# Patient Record
Sex: MALE | Race: WHITE | NOT HISPANIC OR LATINO
[De-identification: names, ages, dates, MRNs, and addresses within clinical notes are randomized per-mention and may not be internally consistent; named-entity substitution may affect disease eponyms.]

---

## 2017-01-10 ENCOUNTER — APPOINTMENT (OUTPATIENT)
Dept: CARDIOLOGY | Facility: CLINIC | Age: 72
End: 2017-01-10

## 2017-01-13 ENCOUNTER — APPOINTMENT (OUTPATIENT)
Dept: CARDIOLOGY | Facility: CLINIC | Age: 72
End: 2017-01-13

## 2017-01-13 ENCOUNTER — NON-APPOINTMENT (OUTPATIENT)
Age: 72
End: 2017-01-13

## 2017-01-13 VITALS
HEART RATE: 74 BPM | RESPIRATION RATE: 17 BRPM | BODY MASS INDEX: 25.47 KG/M2 | DIASTOLIC BLOOD PRESSURE: 85 MMHG | SYSTOLIC BLOOD PRESSURE: 159 MMHG | OXYGEN SATURATION: 97 % | WEIGHT: 188 LBS | HEIGHT: 72 IN

## 2017-01-18 LAB
ALBUMIN SERPL ELPH-MCNC: 3.8 G/DL
ALP BLD-CCNC: 49 U/L
ALT SERPL-CCNC: 15 U/L
ANION GAP SERPL CALC-SCNC: 13 MMOL/L
AST SERPL-CCNC: 21 U/L
BILIRUB SERPL-MCNC: 0.9 MG/DL
BUN SERPL-MCNC: 19 MG/DL
CALCIUM SERPL-MCNC: 9 MG/DL
CHLORIDE SERPL-SCNC: 104 MMOL/L
CHOLEST SERPL-MCNC: 145 MG/DL
CHOLEST/HDLC SERPL: 3.7 RATIO
CO2 SERPL-SCNC: 23 MMOL/L
CREAT SERPL-MCNC: 1.12 MG/DL
GLUCOSE SERPL-MCNC: 92 MG/DL
HDLC SERPL-MCNC: 39 MG/DL
LDLC SERPL CALC-MCNC: 95 MG/DL
POTASSIUM SERPL-SCNC: 4.3 MMOL/L
PROT SERPL-MCNC: 6.7 G/DL
SODIUM SERPL-SCNC: 140 MMOL/L
TRIGL SERPL-MCNC: 53 MG/DL

## 2017-03-07 ENCOUNTER — APPOINTMENT (OUTPATIENT)
Dept: FAMILY MEDICINE | Facility: CLINIC | Age: 72
End: 2017-03-07

## 2017-03-07 VITALS
HEIGHT: 72 IN | BODY MASS INDEX: 25.47 KG/M2 | HEART RATE: 74 BPM | DIASTOLIC BLOOD PRESSURE: 80 MMHG | WEIGHT: 188 LBS | TEMPERATURE: 98.8 F | RESPIRATION RATE: 16 BRPM | OXYGEN SATURATION: 98 % | SYSTOLIC BLOOD PRESSURE: 130 MMHG

## 2017-03-07 DIAGNOSIS — K44.9 DIAPHRAGMATIC HERNIA W/OUT OBSTRUCTION OR GANGRENE: ICD-10-CM

## 2017-03-08 RX ORDER — ESOMEPRAZOLE MAGNESIUM 40 MG/1
40 CAPSULE, DELAYED RELEASE ORAL DAILY
Qty: 30 | Refills: 0 | Status: DISCONTINUED | COMMUNITY
Start: 2017-03-07 | End: 2017-03-08

## 2017-04-17 ENCOUNTER — RX RENEWAL (OUTPATIENT)
Age: 72
End: 2017-04-17

## 2017-05-05 ENCOUNTER — APPOINTMENT (OUTPATIENT)
Dept: CARDIOLOGY | Facility: CLINIC | Age: 72
End: 2017-05-05

## 2017-05-05 VITALS
SYSTOLIC BLOOD PRESSURE: 159 MMHG | OXYGEN SATURATION: 97 % | DIASTOLIC BLOOD PRESSURE: 77 MMHG | HEART RATE: 85 BPM | WEIGHT: 190 LBS | RESPIRATION RATE: 17 BRPM | BODY MASS INDEX: 25.73 KG/M2 | HEIGHT: 72 IN

## 2017-05-05 DIAGNOSIS — R93.8 ABNORMAL FINDINGS ON DIAGNOSTIC IMAGING OF OTHER SPECIFIED BODY STRUCTURES: ICD-10-CM

## 2017-05-05 RX ORDER — UBIDECARENONE/VIT E ACET 100MG-5
100 CAPSULE ORAL TWICE DAILY
Qty: 180 | Refills: 1 | Status: ACTIVE | COMMUNITY
Start: 2017-05-05 | End: 1900-01-01

## 2017-06-28 LAB
ALBUMIN SERPL ELPH-MCNC: 3.9 G/DL
ALP BLD-CCNC: 60 U/L
ALT SERPL-CCNC: 13 U/L
ANION GAP SERPL CALC-SCNC: 14 MMOL/L
AST SERPL-CCNC: 17 U/L
BILIRUB SERPL-MCNC: 1 MG/DL
BUN SERPL-MCNC: 18 MG/DL
CALCIUM SERPL-MCNC: 9.1 MG/DL
CHLORIDE SERPL-SCNC: 101 MMOL/L
CHOLEST SERPL-MCNC: 111 MG/DL
CHOLEST/HDLC SERPL: 2.8 RATIO
CO2 SERPL-SCNC: 24 MMOL/L
CREAT SERPL-MCNC: 1.11 MG/DL
GLUCOSE SERPL-MCNC: 102 MG/DL
HDLC SERPL-MCNC: 39 MG/DL
LDLC SERPL CALC-MCNC: 63 MG/DL
POTASSIUM SERPL-SCNC: 4.2 MMOL/L
PROT SERPL-MCNC: 7.5 G/DL
SODIUM SERPL-SCNC: 139 MMOL/L
TRIGL SERPL-MCNC: 47 MG/DL

## 2017-07-24 ENCOUNTER — RX RENEWAL (OUTPATIENT)
Age: 72
End: 2017-07-24

## 2017-07-31 ENCOUNTER — CLINICAL ADVICE (OUTPATIENT)
Age: 72
End: 2017-07-31

## 2017-09-01 ENCOUNTER — APPOINTMENT (OUTPATIENT)
Dept: FAMILY MEDICINE | Facility: CLINIC | Age: 72
End: 2017-09-01
Payer: MEDICARE

## 2017-09-01 VITALS
TEMPERATURE: 98 F | BODY MASS INDEX: 25.19 KG/M2 | WEIGHT: 186 LBS | OXYGEN SATURATION: 99 % | HEART RATE: 70 BPM | RESPIRATION RATE: 15 BRPM | HEIGHT: 72 IN | DIASTOLIC BLOOD PRESSURE: 70 MMHG | SYSTOLIC BLOOD PRESSURE: 130 MMHG

## 2017-09-01 DIAGNOSIS — Z92.29 PERSONAL HISTORY OF OTHER DRUG THERAPY: ICD-10-CM

## 2017-09-01 PROCEDURE — 90686 IIV4 VACC NO PRSV 0.5 ML IM: CPT

## 2017-09-01 PROCEDURE — G0008: CPT

## 2017-09-01 PROCEDURE — 99397 PER PM REEVAL EST PAT 65+ YR: CPT | Mod: 25

## 2017-09-08 ENCOUNTER — NON-APPOINTMENT (OUTPATIENT)
Age: 72
End: 2017-09-08

## 2017-09-08 ENCOUNTER — APPOINTMENT (OUTPATIENT)
Dept: CARDIOLOGY | Facility: CLINIC | Age: 72
End: 2017-09-08
Payer: MEDICARE

## 2017-09-08 VITALS
OXYGEN SATURATION: 98 % | HEART RATE: 74 BPM | SYSTOLIC BLOOD PRESSURE: 159 MMHG | RESPIRATION RATE: 16 BRPM | BODY MASS INDEX: 25.33 KG/M2 | HEIGHT: 72 IN | WEIGHT: 187 LBS | DIASTOLIC BLOOD PRESSURE: 78 MMHG

## 2017-09-08 PROCEDURE — 93000 ELECTROCARDIOGRAM COMPLETE: CPT

## 2017-09-08 PROCEDURE — 99214 OFFICE O/P EST MOD 30 MIN: CPT

## 2017-10-05 ENCOUNTER — EMERGENCY (EMERGENCY)
Facility: HOSPITAL | Age: 72
LOS: 1 days | Discharge: HOME | End: 2017-10-05
Admitting: FAMILY MEDICINE
Payer: MEDICARE

## 2017-10-05 PROCEDURE — 99284 EMERGENCY DEPT VISIT MOD MDM: CPT

## 2017-10-05 PROCEDURE — 99285 EMERGENCY DEPT VISIT HI MDM: CPT

## 2017-10-05 PROCEDURE — 85610 PROTHROMBIN TIME: CPT

## 2017-10-05 PROCEDURE — 71275 CT ANGIOGRAPHY CHEST: CPT

## 2017-10-05 PROCEDURE — 85730 THROMBOPLASTIN TIME PARTIAL: CPT

## 2017-10-05 PROCEDURE — 85027 COMPLETE CBC AUTOMATED: CPT

## 2017-10-05 PROCEDURE — 99284 EMERGENCY DEPT VISIT MOD MDM: CPT | Mod: 25

## 2017-10-05 PROCEDURE — 74174 CTA ABD&PLVS W/CONTRAST: CPT | Mod: 26

## 2017-10-05 PROCEDURE — 96360 HYDRATION IV INFUSION INIT: CPT | Mod: XU

## 2017-10-05 PROCEDURE — 71275 CT ANGIOGRAPHY CHEST: CPT | Mod: 26

## 2017-10-05 PROCEDURE — 82550 ASSAY OF CK (CPK): CPT

## 2017-10-05 PROCEDURE — 80053 COMPREHEN METABOLIC PANEL: CPT

## 2017-10-05 PROCEDURE — 82553 CREATINE MB FRACTION: CPT

## 2017-10-05 PROCEDURE — 74174 CTA ABD&PLVS W/CONTRAST: CPT

## 2017-10-05 PROCEDURE — 84484 ASSAY OF TROPONIN QUANT: CPT

## 2017-10-11 ENCOUNTER — RX RENEWAL (OUTPATIENT)
Age: 72
End: 2017-10-11

## 2017-10-18 ENCOUNTER — MEDICATION RENEWAL (OUTPATIENT)
Age: 72
End: 2017-10-18

## 2017-10-19 ENCOUNTER — APPOINTMENT (OUTPATIENT)
Dept: FAMILY MEDICINE | Facility: CLINIC | Age: 72
End: 2017-10-19
Payer: MEDICARE

## 2017-10-19 ENCOUNTER — MEDICATION RENEWAL (OUTPATIENT)
Age: 72
End: 2017-10-19

## 2017-10-19 VITALS
DIASTOLIC BLOOD PRESSURE: 80 MMHG | BODY MASS INDEX: 25.87 KG/M2 | WEIGHT: 191 LBS | TEMPERATURE: 97.6 F | SYSTOLIC BLOOD PRESSURE: 130 MMHG | RESPIRATION RATE: 16 BRPM | OXYGEN SATURATION: 97 % | HEIGHT: 72 IN | HEART RATE: 66 BPM

## 2017-10-19 PROCEDURE — 99213 OFFICE O/P EST LOW 20 MIN: CPT

## 2017-11-02 ENCOUNTER — FORM ENCOUNTER (OUTPATIENT)
Age: 72
End: 2017-11-02

## 2017-11-03 ENCOUNTER — APPOINTMENT (OUTPATIENT)
Dept: FAMILY MEDICINE | Facility: CLINIC | Age: 72
End: 2017-11-03
Payer: MEDICARE

## 2017-11-03 ENCOUNTER — OUTPATIENT (OUTPATIENT)
Dept: OUTPATIENT SERVICES | Facility: HOSPITAL | Age: 72
LOS: 1 days | End: 2017-11-03
Payer: MEDICARE

## 2017-11-03 VITALS
TEMPERATURE: 98.6 F | WEIGHT: 190 LBS | BODY MASS INDEX: 25.73 KG/M2 | RESPIRATION RATE: 14 BRPM | DIASTOLIC BLOOD PRESSURE: 70 MMHG | OXYGEN SATURATION: 98 % | HEART RATE: 84 BPM | HEIGHT: 72 IN | SYSTOLIC BLOOD PRESSURE: 120 MMHG

## 2017-11-03 PROCEDURE — 73562 X-RAY EXAM OF KNEE 3: CPT | Mod: 26,RT

## 2017-11-03 PROCEDURE — 73562 X-RAY EXAM OF KNEE 3: CPT

## 2017-11-03 PROCEDURE — 99213 OFFICE O/P EST LOW 20 MIN: CPT

## 2017-11-06 ENCOUNTER — OUTPATIENT (OUTPATIENT)
Dept: OUTPATIENT SERVICES | Facility: HOSPITAL | Age: 72
LOS: 1 days | End: 2017-11-06
Payer: MEDICARE

## 2017-11-29 ENCOUNTER — APPOINTMENT (OUTPATIENT)
Dept: INTERNAL MEDICINE | Facility: CLINIC | Age: 72
End: 2017-11-29
Payer: MEDICARE

## 2017-11-29 VITALS
RESPIRATION RATE: 15 BRPM | DIASTOLIC BLOOD PRESSURE: 76 MMHG | SYSTOLIC BLOOD PRESSURE: 124 MMHG | TEMPERATURE: 98.4 F | WEIGHT: 190 LBS | HEIGHT: 72 IN | BODY MASS INDEX: 25.73 KG/M2 | HEART RATE: 88 BPM

## 2017-11-29 DIAGNOSIS — Z12.11 ENCOUNTER FOR SCREENING FOR MALIGNANT NEOPLASM OF COLON: ICD-10-CM

## 2017-11-29 DIAGNOSIS — M25.561 PAIN IN RIGHT KNEE: ICD-10-CM

## 2017-11-29 PROCEDURE — 97110 THERAPEUTIC EXERCISES: CPT

## 2017-11-29 PROCEDURE — 99215 OFFICE O/P EST HI 40 MIN: CPT

## 2017-11-29 PROCEDURE — G0283: CPT

## 2017-11-29 PROCEDURE — 97112 NEUROMUSCULAR REEDUCATION: CPT

## 2017-11-29 PROCEDURE — 97161 PT EVAL LOW COMPLEX 20 MIN: CPT

## 2017-11-30 ENCOUNTER — APPOINTMENT (OUTPATIENT)
Dept: FAMILY MEDICINE | Facility: CLINIC | Age: 72
End: 2017-11-30
Payer: MEDICARE

## 2017-11-30 VITALS
RESPIRATION RATE: 14 BRPM | HEART RATE: 77 BPM | DIASTOLIC BLOOD PRESSURE: 80 MMHG | BODY MASS INDEX: 25.73 KG/M2 | TEMPERATURE: 97.8 F | WEIGHT: 190 LBS | HEIGHT: 72 IN | SYSTOLIC BLOOD PRESSURE: 120 MMHG | OXYGEN SATURATION: 97 %

## 2017-11-30 LAB
FOLATE SERPL-MCNC: 16 NG/ML
VIT B12 SERPL-MCNC: 385 PG/ML

## 2017-11-30 PROCEDURE — 99214 OFFICE O/P EST MOD 30 MIN: CPT

## 2018-01-05 ENCOUNTER — OUTPATIENT (OUTPATIENT)
Dept: OUTPATIENT SERVICES | Facility: HOSPITAL | Age: 73
LOS: 1 days | End: 2018-01-05
Payer: MEDICARE

## 2018-01-05 ENCOUNTER — APPOINTMENT (OUTPATIENT)
Dept: MRI IMAGING | Facility: HOSPITAL | Age: 73
End: 2018-01-05
Payer: MEDICARE

## 2018-01-05 DIAGNOSIS — Z00.8 ENCOUNTER FOR OTHER GENERAL EXAMINATION: ICD-10-CM

## 2018-01-05 PROCEDURE — 72148 MRI LUMBAR SPINE W/O DYE: CPT | Mod: 26

## 2018-01-05 PROCEDURE — 72148 MRI LUMBAR SPINE W/O DYE: CPT

## 2018-01-12 ENCOUNTER — APPOINTMENT (OUTPATIENT)
Dept: CARDIOLOGY | Facility: CLINIC | Age: 73
End: 2018-01-12
Payer: MEDICARE

## 2018-01-12 VITALS
BODY MASS INDEX: 25.47 KG/M2 | RESPIRATION RATE: 15 BRPM | HEIGHT: 72 IN | HEART RATE: 81 BPM | WEIGHT: 188 LBS | DIASTOLIC BLOOD PRESSURE: 89 MMHG | OXYGEN SATURATION: 97 % | SYSTOLIC BLOOD PRESSURE: 150 MMHG

## 2018-01-12 PROCEDURE — 99214 OFFICE O/P EST MOD 30 MIN: CPT

## 2018-01-12 PROCEDURE — 93306 TTE W/DOPPLER COMPLETE: CPT

## 2018-01-12 PROCEDURE — 93000 ELECTROCARDIOGRAM COMPLETE: CPT

## 2018-01-18 ENCOUNTER — APPOINTMENT (OUTPATIENT)
Dept: FAMILY MEDICINE | Facility: CLINIC | Age: 73
End: 2018-01-18
Payer: MEDICARE

## 2018-01-18 PROCEDURE — 90715 TDAP VACCINE 7 YRS/> IM: CPT

## 2018-01-18 PROCEDURE — 90471 IMMUNIZATION ADMIN: CPT

## 2018-02-05 ENCOUNTER — APPOINTMENT (OUTPATIENT)
Dept: INTERNAL MEDICINE | Facility: HOSPITAL | Age: 73
End: 2018-02-05

## 2018-03-05 ENCOUNTER — APPOINTMENT (OUTPATIENT)
Dept: INTERNAL MEDICINE | Facility: HOSPITAL | Age: 73
End: 2018-03-05
Payer: MEDICARE

## 2018-03-05 ENCOUNTER — RESULT REVIEW (OUTPATIENT)
Age: 73
End: 2018-03-05

## 2018-03-05 ENCOUNTER — OUTPATIENT (OUTPATIENT)
Dept: OUTPATIENT SERVICES | Facility: HOSPITAL | Age: 73
LOS: 1 days | End: 2018-03-05
Payer: MEDICARE

## 2018-03-05 DIAGNOSIS — Z12.11 ENCOUNTER FOR SCREENING FOR MALIGNANT NEOPLASM OF COLON: ICD-10-CM

## 2018-03-05 PROCEDURE — 45380 COLONOSCOPY AND BIOPSY: CPT | Mod: PT

## 2018-03-05 PROCEDURE — 88305 TISSUE EXAM BY PATHOLOGIST: CPT | Mod: 26

## 2018-03-05 PROCEDURE — 88305 TISSUE EXAM BY PATHOLOGIST: CPT

## 2018-03-05 PROCEDURE — 45380 COLONOSCOPY AND BIOPSY: CPT

## 2018-04-02 ENCOUNTER — RX RENEWAL (OUTPATIENT)
Age: 73
End: 2018-04-02

## 2018-04-18 ENCOUNTER — RX RENEWAL (OUTPATIENT)
Age: 73
End: 2018-04-18

## 2018-04-24 ENCOUNTER — APPOINTMENT (OUTPATIENT)
Dept: FAMILY MEDICINE | Facility: CLINIC | Age: 73
End: 2018-04-24
Payer: MEDICARE

## 2018-04-24 VITALS
WEIGHT: 186 LBS | HEART RATE: 76 BPM | HEIGHT: 72 IN | DIASTOLIC BLOOD PRESSURE: 76 MMHG | SYSTOLIC BLOOD PRESSURE: 128 MMHG | TEMPERATURE: 97.8 F | OXYGEN SATURATION: 98 % | BODY MASS INDEX: 25.19 KG/M2 | RESPIRATION RATE: 14 BRPM

## 2018-04-24 DIAGNOSIS — R05 COUGH: ICD-10-CM

## 2018-04-24 PROCEDURE — 99214 OFFICE O/P EST MOD 30 MIN: CPT

## 2018-04-25 LAB
ALBUMIN SERPL ELPH-MCNC: 4.2 G/DL
ALP BLD-CCNC: 58 U/L
ALT SERPL-CCNC: 23 U/L
ANION GAP SERPL CALC-SCNC: 16 MMOL/L
AST SERPL-CCNC: 47 U/L
BASOPHILS # BLD AUTO: 0 K/UL
BASOPHILS NFR BLD AUTO: 0 %
BILIRUB SERPL-MCNC: 1.3 MG/DL
BUN SERPL-MCNC: 18 MG/DL
CALCIUM SERPL-MCNC: 9.2 MG/DL
CHLORIDE SERPL-SCNC: 103 MMOL/L
CHOLEST SERPL-MCNC: 114 MG/DL
CHOLEST/HDLC SERPL: 2.9 RATIO
CO2 SERPL-SCNC: 22 MMOL/L
CREAT SERPL-MCNC: 1.29 MG/DL
EOSINOPHIL # BLD AUTO: 0.15 K/UL
EOSINOPHIL NFR BLD AUTO: 2.7 %
FOLATE SERPL-MCNC: 17.4 NG/ML
GLUCOSE SERPL-MCNC: 100 MG/DL
HCT VFR BLD CALC: 39.7 %
HDLC SERPL-MCNC: 40 MG/DL
HGB BLD-MCNC: 13.2 G/DL
IMM GRANULOCYTES NFR BLD AUTO: 0 %
LDLC SERPL CALC-MCNC: 65 MG/DL
LYMPHOCYTES # BLD AUTO: 1.81 K/UL
LYMPHOCYTES NFR BLD AUTO: 32.8 %
MAN DIFF?: NORMAL
MCHC RBC-ENTMCNC: 30.3 PG
MCHC RBC-ENTMCNC: 33.2 GM/DL
MCV RBC AUTO: 91.3 FL
MONOCYTES # BLD AUTO: 0.52 K/UL
MONOCYTES NFR BLD AUTO: 9.4 %
NEUTROPHILS # BLD AUTO: 3.03 K/UL
NEUTROPHILS NFR BLD AUTO: 55.1 %
PLATELET # BLD AUTO: 230 K/UL
POTASSIUM SERPL-SCNC: 4.2 MMOL/L
PROT SERPL-MCNC: 6.9 G/DL
RBC # BLD: 4.35 M/UL
RBC # FLD: 13.8 %
SODIUM SERPL-SCNC: 141 MMOL/L
TRIGL SERPL-MCNC: 44 MG/DL
VIT B12 SERPL-MCNC: 396 PG/ML
WBC # FLD AUTO: 5.51 K/UL

## 2018-05-09 ENCOUNTER — NON-APPOINTMENT (OUTPATIENT)
Age: 73
End: 2018-05-09

## 2018-05-09 ENCOUNTER — APPOINTMENT (OUTPATIENT)
Dept: CARDIOLOGY | Facility: CLINIC | Age: 73
End: 2018-05-09
Payer: MEDICARE

## 2018-05-09 VITALS
RESPIRATION RATE: 15 BRPM | HEART RATE: 72 BPM | WEIGHT: 190 LBS | OXYGEN SATURATION: 98 % | SYSTOLIC BLOOD PRESSURE: 136 MMHG | BODY MASS INDEX: 25.73 KG/M2 | HEIGHT: 72 IN | DIASTOLIC BLOOD PRESSURE: 81 MMHG

## 2018-05-09 PROCEDURE — 93015 CV STRESS TEST SUPVJ I&R: CPT

## 2018-05-09 PROCEDURE — 93000 ELECTROCARDIOGRAM COMPLETE: CPT | Mod: 59

## 2018-05-09 PROCEDURE — 99214 OFFICE O/P EST MOD 30 MIN: CPT | Mod: 25

## 2018-06-07 ENCOUNTER — EMERGENCY (EMERGENCY)
Facility: HOSPITAL | Age: 73
LOS: 1 days | Discharge: HOME | End: 2018-06-07
Admitting: EMERGENCY MEDICINE
Payer: MEDICARE

## 2018-06-07 PROCEDURE — 99283 EMERGENCY DEPT VISIT LOW MDM: CPT

## 2018-06-07 PROCEDURE — 73562 X-RAY EXAM OF KNEE 3: CPT

## 2018-06-07 PROCEDURE — 73562 X-RAY EXAM OF KNEE 3: CPT | Mod: 26,RT

## 2018-06-28 ENCOUNTER — APPOINTMENT (OUTPATIENT)
Dept: ORTHOPEDIC SURGERY | Facility: CLINIC | Age: 73
End: 2018-06-28
Payer: MEDICARE

## 2018-06-28 VITALS
HEART RATE: 69 BPM | SYSTOLIC BLOOD PRESSURE: 154 MMHG | HEIGHT: 72 IN | DIASTOLIC BLOOD PRESSURE: 89 MMHG | BODY MASS INDEX: 25.06 KG/M2 | WEIGHT: 185 LBS | RESPIRATION RATE: 15 BRPM

## 2018-06-28 PROCEDURE — 99203 OFFICE O/P NEW LOW 30 MIN: CPT

## 2018-07-20 ENCOUNTER — APPOINTMENT (OUTPATIENT)
Dept: FAMILY MEDICINE | Facility: CLINIC | Age: 73
End: 2018-07-20
Payer: MEDICARE

## 2018-07-20 VITALS
HEART RATE: 74 BPM | WEIGHT: 193 LBS | SYSTOLIC BLOOD PRESSURE: 160 MMHG | DIASTOLIC BLOOD PRESSURE: 80 MMHG | TEMPERATURE: 98.2 F | BODY MASS INDEX: 26.18 KG/M2 | RESPIRATION RATE: 14 BRPM | OXYGEN SATURATION: 97 %

## 2018-07-20 PROCEDURE — 99213 OFFICE O/P EST LOW 20 MIN: CPT

## 2018-07-20 NOTE — PHYSICAL EXAM
[No Acute Distress] : no acute distress [Clear to Auscultation] : lungs were clear to auscultation bilaterally [No Accessory Muscle Use] : no accessory muscle use [Regular Rhythm] : with a regular rhythm [Normal S1, S2] : normal S1 and S2 [Soft] : abdomen soft [Non Tender] : non-tender [Non-distended] : non-distended [No Masses] : no abdominal mass palpated [No CVA Tenderness] : no CVA  tenderness [No Spinal Tenderness] : no spinal tenderness [No Joint Swelling] : no joint swelling [Grossly Normal Strength/Tone] : grossly normal strength/tone [No Rash] : no rash

## 2018-07-20 NOTE — HISTORY OF PRESENT ILLNESS
[FreeTextEntry1] : f/u [de-identified] : 72 y/o PMHX HTN HLD CAD here for f/u and c/o states knee pain better now swelling after ice and compression. pt now keeps active and feels is doing well pt now here no active issues using compression stocking feels fine no pain SOB dizziness or palpitations

## 2018-07-20 NOTE — REVIEW OF SYSTEMS
[Joint Pain] : joint pain [Joint Stiffness] : joint stiffness [Muscle Pain] : no muscle pain [Muscle Weakness] : no muscle weakness [Back Pain] : no back pain [Joint Swelling] : joint swelling [Negative] : Heme/Lymph

## 2018-07-21 ENCOUNTER — TRANSCRIPTION ENCOUNTER (OUTPATIENT)
Age: 73
End: 2018-07-21

## 2018-07-23 LAB
ALBUMIN SERPL ELPH-MCNC: 3.9 G/DL
ALP BLD-CCNC: 52 U/L
ALT SERPL-CCNC: 11 U/L
ANION GAP SERPL CALC-SCNC: 12 MMOL/L
AST SERPL-CCNC: 18 U/L
BILIRUB SERPL-MCNC: 0.7 MG/DL
BUN SERPL-MCNC: 20 MG/DL
CALCIUM SERPL-MCNC: 8.8 MG/DL
CHLORIDE SERPL-SCNC: 104 MMOL/L
CHOLEST SERPL-MCNC: 105 MG/DL
CHOLEST/HDLC SERPL: 2.6 RATIO
CO2 SERPL-SCNC: 24 MMOL/L
CREAT SERPL-MCNC: 1.04 MG/DL
GLUCOSE SERPL-MCNC: 99 MG/DL
HDLC SERPL-MCNC: 40 MG/DL
LDLC SERPL CALC-MCNC: 58 MG/DL
POTASSIUM SERPL-SCNC: 4.3 MMOL/L
PROT SERPL-MCNC: 6.6 G/DL
SODIUM SERPL-SCNC: 140 MMOL/L
TRIGL SERPL-MCNC: 35 MG/DL

## 2018-09-05 ENCOUNTER — EMERGENCY (EMERGENCY)
Facility: HOSPITAL | Age: 73
LOS: 1 days | Discharge: HOME | End: 2018-09-05
Attending: EMERGENCY MEDICINE | Admitting: EMERGENCY MEDICINE
Payer: MEDICARE

## 2018-09-05 VITALS
RESPIRATION RATE: 18 BRPM | SYSTOLIC BLOOD PRESSURE: 153 MMHG | WEIGHT: 190.26 LBS | HEART RATE: 77 BPM | TEMPERATURE: 99 F | DIASTOLIC BLOOD PRESSURE: 66 MMHG | HEIGHT: 72 IN

## 2018-09-05 LAB — GLUCOSE BLDC GLUCOMTR-MCNC: 112 MG/DL — HIGH (ref 70–99)

## 2018-09-05 PROCEDURE — 93005 ELECTROCARDIOGRAM TRACING: CPT

## 2018-09-05 PROCEDURE — 93010 ELECTROCARDIOGRAM REPORT: CPT

## 2018-09-05 PROCEDURE — 82962 GLUCOSE BLOOD TEST: CPT

## 2018-09-05 PROCEDURE — 99284 EMERGENCY DEPT VISIT MOD MDM: CPT | Mod: 25

## 2018-09-05 PROCEDURE — 99283 EMERGENCY DEPT VISIT LOW MDM: CPT

## 2018-09-05 NOTE — ED PROVIDER NOTE - MEDICAL DECISION MAKING DETAILS
pt had some nonspecific episode awakening from sleep, with air gasping that lasted seconds, has normal exam and normal EKG and finger stic, no intervention needed in ed

## 2018-09-05 NOTE — ED PROVIDER NOTE - OBJECTIVE STATEMENT
Pertinent PMH/PSH/FHx/SHx and Review of Systems contained within:  74 y/o male with h/o thoracis aneurism, presents to ed c/o sudden onset episode of gasping of air with some weird taste in mouth. No chest pain, no tongue bite. Pt feels fine now.

## 2018-09-12 ENCOUNTER — APPOINTMENT (OUTPATIENT)
Dept: CARDIOLOGY | Facility: CLINIC | Age: 73
End: 2018-09-12
Payer: MEDICARE

## 2018-09-12 ENCOUNTER — NON-APPOINTMENT (OUTPATIENT)
Age: 73
End: 2018-09-12

## 2018-09-12 VITALS
OXYGEN SATURATION: 96 % | RESPIRATION RATE: 15 BRPM | HEART RATE: 70 BPM | WEIGHT: 187 LBS | HEIGHT: 72 IN | SYSTOLIC BLOOD PRESSURE: 146 MMHG | BODY MASS INDEX: 25.33 KG/M2 | DIASTOLIC BLOOD PRESSURE: 81 MMHG

## 2018-09-12 PROBLEM — I71.2 THORACIC AORTIC ANEURYSM, WITHOUT RUPTURE: Chronic | Status: ACTIVE | Noted: 2018-09-05

## 2018-09-12 PROCEDURE — 99214 OFFICE O/P EST MOD 30 MIN: CPT

## 2018-09-12 PROCEDURE — 93000 ELECTROCARDIOGRAM COMPLETE: CPT

## 2018-09-20 ENCOUNTER — APPOINTMENT (OUTPATIENT)
Dept: FAMILY MEDICINE | Facility: CLINIC | Age: 73
End: 2018-09-20
Payer: MEDICARE

## 2018-09-20 VITALS
SYSTOLIC BLOOD PRESSURE: 130 MMHG | WEIGHT: 187 LBS | OXYGEN SATURATION: 97 % | DIASTOLIC BLOOD PRESSURE: 80 MMHG | RESPIRATION RATE: 14 BRPM | TEMPERATURE: 98 F | BODY MASS INDEX: 25.36 KG/M2 | HEART RATE: 67 BPM

## 2018-09-20 DIAGNOSIS — M79.89 OTHER SPECIFIED SOFT TISSUE DISORDERS: ICD-10-CM

## 2018-09-20 DIAGNOSIS — Z09 ENCOUNTER FOR FOLLOW-UP EXAMINATION AFTER COMPLETED TREATMENT FOR CONDITIONS OTHER THAN MALIGNANT NEOPLASM: ICD-10-CM

## 2018-09-20 DIAGNOSIS — Z12.11 ENCOUNTER FOR SCREENING FOR MALIGNANT NEOPLASM OF COLON: ICD-10-CM

## 2018-09-20 DIAGNOSIS — H61.23 IMPACTED CERUMEN, BILATERAL: ICD-10-CM

## 2018-09-20 PROCEDURE — G0008: CPT

## 2018-09-20 PROCEDURE — 90686 IIV4 VACC NO PRSV 0.5 ML IM: CPT

## 2018-09-20 PROCEDURE — G0439: CPT | Mod: 25

## 2018-09-20 NOTE — REVIEW OF SYSTEMS
[Vision Problems] : vision problems [Joint Pain] : joint pain [Joint Stiffness] : joint stiffness [Muscle Pain] : muscle pain [Muscle Weakness] : muscle weakness [Negative] : Heme/Lymph [Joint Swelling] : no joint swelling [FreeTextEntry3] : wears glasses [FreeTextEntry9] : bilateral knee pain R>L [de-identified] : cherry angioma

## 2018-09-20 NOTE — HEALTH RISK ASSESSMENT
[Good] : ~his/her~ current health as good [Very Good] : ~his/her~  mood as very good [No falls in past year] : Patient reported no falls in the past year [0] : 2) Feeling down, depressed, or hopeless: Not at all (0) [HIV test declined] : HIV test declined [Hepatitis C test offered] : Hepatitis C test offered [None] : None [Alone] : lives alone [Employed] : employed [Graduate School] : graduate school [Single] : single [Feels Safe at Home] : Feels safe at home [Fully functional (bathing, dressing, toileting, transferring, walking, feeding)] : Fully functional (bathing, dressing, toileting, transferring, walking, feeding) [Fully functional (using the telephone, shopping, preparing meals, housekeeping, doing laundry, using] : Fully functional and needs no help or supervision to perform IADLs (using the telephone, shopping, preparing meals, housekeeping, doing laundry, using transportation, managing medications and managing finances) [Smoke Detector] : smoke detector [Carbon Monoxide Detector] : carbon monoxide detector [Safety elements used in home] : safety elements used in home [Seat Belt] :  uses seat belt [Designated Healthcare Proxy] : Designated healthcare proxy [Name: ___] : Health Care Proxy's Name: [unfilled]  [Relationship: ___] : Relationship: [unfilled] [Intercurrent ED visits] : went to ED [] : No [Change in mental status noted] : No change in mental status noted [Language] : denies difficulty with language [Behavior] : denies difficulty with behavior [Learning/Retaining New Information] : denies difficulty learning/retaining new information [Handling Complex Tasks] : denies difficulty handling complex tasks [Reasoning] : denies difficulty with reasoning [Spatial Ability and Orientation] : denies difficulty with spatial ability and orientation [Reports changes in hearing] : Reports no changes in hearing [Reports changes in vision] : Reports no changes in vision [Reports changes in dental health] : Reports no changes in dental health [Guns at Home] : no guns at home [Sunscreen] : does not use sunscreen [TB Exposure] : is not being exposed to tuberculosis [Caregiver Concerns] : does not have caregiver concerns [de-identified] : slight discomfort and neuropathies in feet and legs

## 2018-09-20 NOTE — HISTORY OF PRESENT ILLNESS
[FreeTextEntry1] : CPE  f/u HTN HLD [de-identified] : Pt here for annual exam and also for f/u ER visit. Seen at hospital after episode of SOB 2 weeks ago, not admitted.   No complaints today.  But does get a lot knee pain more right knee when walks  Asking to get handicap parking but reports is able ot walk 1/2 mile ok no SOB some knee pain. Pt dines bowel or bladder issues

## 2018-09-20 NOTE — PHYSICAL EXAM
[No Acute Distress] : no acute distress [Well Nourished] : well nourished [Well Developed] : well developed [Well-Appearing] : well-appearing [Normal Sclera/Conjunctiva] : normal sclera/conjunctiva [PERRL] : pupils equal round and reactive to light [EOMI] : extraocular movements intact [Normal Outer Ear/Nose] : the outer ears and nose were normal in appearance [Normal Oropharynx] : the oropharynx was normal [Normal TMs] : both tympanic membranes were normal [No JVD] : no jugular venous distention [Supple] : supple [No Lymphadenopathy] : no lymphadenopathy [Thyroid Normal, No Nodules] : the thyroid was normal and there were no nodules present [No Respiratory Distress] : no respiratory distress  [Clear to Auscultation] : lungs were clear to auscultation bilaterally [No Accessory Muscle Use] : no accessory muscle use [Normal Rate] : normal rate  [Regular Rhythm] : with a regular rhythm [Normal S1, S2] : normal S1 and S2 [No Murmur] : no murmur heard [No Carotid Bruits] : no carotid bruits [No Abdominal Bruit] : a ~M bruit was not heard ~T in the abdomen [No Varicosities] : no varicosities [Pedal Pulses Present] : the pedal pulses are present [No Edema] : there was no peripheral edema [No Extremity Clubbing/Cyanosis] : no extremity clubbing/cyanosis [No Palpable Aorta] : no palpable aorta [Soft] : abdomen soft [Non Tender] : non-tender [Non-distended] : non-distended [No Masses] : no abdominal mass palpated [No HSM] : no HSM [Normal Bowel Sounds] : normal bowel sounds [Normal Posterior Cervical Nodes] : no posterior cervical lymphadenopathy [Normal Anterior Cervical Nodes] : no anterior cervical lymphadenopathy [No CVA Tenderness] : no CVA  tenderness [No Spinal Tenderness] : no spinal tenderness [No Joint Swelling] : no joint swelling [Grossly Normal Strength/Tone] : grossly normal strength/tone [No Rash] : no rash [Normal Gait] : normal gait [Coordination Grossly Intact] : coordination grossly intact [No Focal Deficits] : no focal deficits [Deep Tendon Reflexes (DTR)] : deep tendon reflexes were 2+ and symmetric [Normal Affect] : the affect was normal [Normal Insight/Judgement] : insight and judgment were intact [Comprehensive Foot Exam Normal] : Right and left foot were examined and both feet are normal. No ulcers in either foot. Toes are normal and with full ROM.  Normal tactile sensation with monofilament testing throughout both feet [de-identified] : pain on ROM right knee with crepitus no leg edema  [de-identified] : 2+ pulses

## 2018-09-25 ENCOUNTER — RX RENEWAL (OUTPATIENT)
Age: 73
End: 2018-09-25

## 2018-10-14 ENCOUNTER — RX RENEWAL (OUTPATIENT)
Age: 73
End: 2018-10-14

## 2018-12-01 ENCOUNTER — EMERGENCY (EMERGENCY)
Facility: HOSPITAL | Age: 73
LOS: 1 days | Discharge: HOME | End: 2018-12-01
Attending: EMERGENCY MEDICINE | Admitting: EMERGENCY MEDICINE
Payer: MEDICARE

## 2018-12-01 VITALS
HEART RATE: 77 BPM | DIASTOLIC BLOOD PRESSURE: 83 MMHG | HEIGHT: 72 IN | WEIGHT: 154.98 LBS | OXYGEN SATURATION: 95 % | TEMPERATURE: 99 F | RESPIRATION RATE: 16 BRPM | SYSTOLIC BLOOD PRESSURE: 158 MMHG

## 2018-12-01 DIAGNOSIS — M79.646 PAIN IN UNSPECIFIED FINGER(S): ICD-10-CM

## 2018-12-01 PROCEDURE — 99283 EMERGENCY DEPT VISIT LOW MDM: CPT | Mod: 25

## 2018-12-01 PROCEDURE — 29125 APPL SHORT ARM SPLINT STATIC: CPT

## 2018-12-01 PROCEDURE — 29125 APPL SHORT ARM SPLINT STATIC: CPT | Mod: RT

## 2018-12-01 PROCEDURE — 73140 X-RAY EXAM OF FINGER(S): CPT

## 2018-12-01 PROCEDURE — 73140 X-RAY EXAM OF FINGER(S): CPT | Mod: 26,RT

## 2018-12-01 RX ORDER — ACETAMINOPHEN 500 MG
650 TABLET ORAL ONCE
Qty: 0 | Refills: 0 | Status: DISCONTINUED | OUTPATIENT
Start: 2018-12-01 | End: 2018-12-05

## 2018-12-01 NOTE — ED ADULT NURSE NOTE - PMH
Aortic aneurysm, intrathoracic    GERD (gastroesophageal reflux disease)    HTN (hypertension)    Hyperlipemia

## 2018-12-01 NOTE — ED PROVIDER NOTE - CHIEF COMPLAINT
Abdomen soft, nontender, nondistended, bowel sounds present in all 4 quadrants.
The patient is a 73y Male complaining of finger pain/injury.

## 2018-12-01 NOTE — ED PROVIDER NOTE - PROGRESS NOTE DETAILS
MIKAYLA Mcgregor- spoke with plastics/hand Dr. Fuentes. Described injury and suspected extensor tendon tear. He will see pt in his office on Monday.  Thumb spica placed.

## 2018-12-01 NOTE — ED ADULT NURSE NOTE - NSIMPLEMENTINTERV_GEN_ALL_ED
Implemented All Universal Safety Interventions:  [place] to call system. Call bell, personal items and telephone within reach. Instruct patient to call for assistance. Room bathroom lighting operational. Non-slip footwear when patient is off stretcher. Physically safe environment: no spills, clutter or unnecessary equipment. Stretcher in lowest position, wheels locked, appropriate side rails in place.

## 2018-12-01 NOTE — ED PROVIDER NOTE - CARE PROVIDER_API CALL
Ross Burt (MD), Plastic Surgery; Surgery of the Hand  [address] [place] [address]  Phone: [phone]  Fax: [phone]

## 2018-12-01 NOTE — ED PROVIDER NOTE - OBJECTIVE STATEMENT
72 y/o M with h/o HTN, PVD presenting with sudden onset right thumb pain while grabbing an object. Denies direct trauma, weakness, numbness, tingling. Denies prior injuries. (+) right hand dominant 74 y/o M with h/o HTN, PVD presenting with sudden onset right thumb pain radiating up his arm while grabbing an object. Denies direct trauma, weakness, numbness, tingling. Denies prior injuries. (+) right hand dominant. Took no pain medications prior to arrival.

## 2018-12-01 NOTE — ED PROVIDER NOTE - MEDICAL DECISION MAKING DETAILS
Atraumatic right thumb pain s/p grabbing an object presenting with pain on extension at MCP- xray r/o fracture, splint, Tylenol, Ortho follow up

## 2018-12-01 NOTE — ED PROVIDER NOTE - PHYSICAL EXAMINATION
right thumb: mild ttp wrist, IP joint of thumb in flexion, unable to extend IP joint of thumb  arthritic cahanges at basal joint

## 2018-12-01 NOTE — ED PROVIDER NOTE - UPPER EXTREMITY EXAM, RIGHT
TENDERNESS/TTP at base of thumb at MCP. Unable to extend, no difficulty with flexion. No swelling, no ecchymosis. No scaphoid tenderness. (+)2 radial pulse

## 2018-12-01 NOTE — ED PROVIDER NOTE - NSFOLLOWUPINSTRUCTIONS_ED_ALL_ED_FT
Follow up with Dr. Burt above on Monday (call his office for appt. Monday morning). Take Tylenol 650mg every 4-6 hours as needed for pain. Keep splint on and dry until further evaluation. Worsening, continued or ANY new concerning symptoms return to the emergency department.

## 2018-12-01 NOTE — ED PROVIDER NOTE - ATTENDING CONTRIBUTION TO CARE
Dr. Barboza: I performed a face to face bedside interview with patient regarding history of present illness, review of symptoms and past medical history. I completed an independent physical exam.  I have discussed patient's plan of care with PA.   I agree with note as stated above, having amended the EMR as needed to reflect my findings.   This includes HISTORY OF PRESENT ILLNESS, HIV, PAST MEDICAL/SURGICAL/FAMILY/SOCIAL HISTORY, ALLERGIES AND HOME MEDICATIONS, REVIEW OF SYSTEMS, PHYSICAL EXAM, and any PROGRESS NOTES during the time I functioned as the attending physician for this patient.    dr barboza: pt with inability to extend ip joint of thumb following grabbing something, xray, thumb spica splint, hand specialist f/u

## 2018-12-03 PROBLEM — K21.9 GASTRO-ESOPHAGEAL REFLUX DISEASE WITHOUT ESOPHAGITIS: Chronic | Status: ACTIVE | Noted: 2018-12-01

## 2018-12-03 PROBLEM — E78.5 HYPERLIPIDEMIA, UNSPECIFIED: Chronic | Status: ACTIVE | Noted: 2018-12-01

## 2018-12-03 PROBLEM — I10 ESSENTIAL (PRIMARY) HYPERTENSION: Chronic | Status: ACTIVE | Noted: 2018-12-01

## 2018-12-04 ENCOUNTER — APPOINTMENT (OUTPATIENT)
Dept: FAMILY MEDICINE | Facility: CLINIC | Age: 73
End: 2018-12-04
Payer: MEDICARE

## 2018-12-04 VITALS
TEMPERATURE: 97 F | WEIGHT: 186 LBS | HEIGHT: 72 IN | OXYGEN SATURATION: 98 % | SYSTOLIC BLOOD PRESSURE: 120 MMHG | BODY MASS INDEX: 25.19 KG/M2 | DIASTOLIC BLOOD PRESSURE: 80 MMHG | HEART RATE: 68 BPM | RESPIRATION RATE: 14 BRPM

## 2018-12-04 PROCEDURE — 99213 OFFICE O/P EST LOW 20 MIN: CPT

## 2018-12-05 NOTE — PHYSICAL EXAM
[No Acute Distress] : no acute distress [Clear to Auscultation] : lungs were clear to auscultation bilaterally [No Accessory Muscle Use] : no accessory muscle use [Normal Rate] : normal rate  [Regular Rhythm] : with a regular rhythm [Normal S1, S2] : normal S1 and S2 [No CVA Tenderness] : no CVA  tenderness [Normal Gait] : normal gait [de-identified] : mild LS spine pain radiates to left siatic notch no skin rashes no leg weakness no pain on ROM hip pain on external rotation lateral ischial band area [de-identified] : good capillary refill no swelling right hand

## 2018-12-05 NOTE — REVIEW OF SYSTEMS
[Joint Pain] : joint pain [Joint Stiffness] : joint stiffness [Muscle Pain] : muscle pain [Muscle Weakness] : no muscle weakness [Back Pain] : back pain [Joint Swelling] : joint swelling [Negative] : Heme/Lymph [FreeTextEntry9] : pain l;eft hip and left lateral leg  Pain right hand forearm now with brace in place

## 2018-12-05 NOTE — HISTORY OF PRESENT ILLNESS
[FreeTextEntry8] : 72 y/o PMH HTN HLD BPH Thoracic aneurysmal here with c/o leg cramping as well this past Saturday when to reach with his right hand and felt severe pain going up the forearm and arm. pt ended up in ER with severe pain and thumb right hand hyperflex and had x-ray done no fractures has apt to get MRI  and also has apt with ortho Now has no pain  Pt also pain left lateral leg muscle area comes and goes does not have it now. pt bikes and does not have pain when bikes. Pt f/u ortho

## 2018-12-05 NOTE — HEALTH RISK ASSESSMENT
[Intercurrent ED visits] : went to ED [No falls in past year] : Patient reported no falls in the past year [0] : 2) Feeling down, depressed, or hopeless: Not at all (0) [] : No [de-identified] : right hand pain contracture right hand thum [[id number]] : 0

## 2018-12-05 NOTE — COUNSELING
[Healthy eating counseling provided] : healthy eating [Activity counseling provided] : activity [None] : None [Good understanding] : Patient has a good understanding of lifestyle changes and the steps needed to achieve self management goals [de-identified] : f/u ortho PT d/w opt and ROM stretching exercises

## 2018-12-06 ENCOUNTER — APPOINTMENT (OUTPATIENT)
Dept: ORTHOPEDIC SURGERY | Facility: CLINIC | Age: 73
End: 2018-12-06
Payer: MEDICARE

## 2018-12-06 VITALS — WEIGHT: 185 LBS | BODY MASS INDEX: 25.06 KG/M2 | HEIGHT: 72 IN

## 2018-12-06 PROCEDURE — 99213 OFFICE O/P EST LOW 20 MIN: CPT

## 2018-12-07 ENCOUNTER — APPOINTMENT (OUTPATIENT)
Dept: CARDIOLOGY | Facility: CLINIC | Age: 73
End: 2018-12-07

## 2018-12-29 ENCOUNTER — TRANSCRIPTION ENCOUNTER (OUTPATIENT)
Age: 73
End: 2018-12-29

## 2019-01-08 ENCOUNTER — APPOINTMENT (OUTPATIENT)
Dept: FAMILY MEDICINE | Facility: CLINIC | Age: 74
End: 2019-01-08
Payer: MEDICARE

## 2019-01-08 ENCOUNTER — APPOINTMENT (OUTPATIENT)
Dept: ORTHOPEDIC SURGERY | Facility: HOSPITAL | Age: 74
End: 2019-01-08

## 2019-01-08 VITALS
RESPIRATION RATE: 14 BRPM | DIASTOLIC BLOOD PRESSURE: 80 MMHG | TEMPERATURE: 98 F | WEIGHT: 178 LBS | OXYGEN SATURATION: 99 % | HEART RATE: 78 BPM | SYSTOLIC BLOOD PRESSURE: 120 MMHG | HEIGHT: 72 IN | BODY MASS INDEX: 24.11 KG/M2

## 2019-01-08 DIAGNOSIS — M25.561 PAIN IN RIGHT KNEE: ICD-10-CM

## 2019-01-08 DIAGNOSIS — S60.011A CONTUSION OF RIGHT THUMB W/OUT DAMAGE TO NAIL, INITIAL ENCOUNTER: ICD-10-CM

## 2019-01-08 DIAGNOSIS — Z23 ENCOUNTER FOR IMMUNIZATION: ICD-10-CM

## 2019-01-08 DIAGNOSIS — Z92.29 PERSONAL HISTORY OF OTHER DRUG THERAPY: ICD-10-CM

## 2019-01-08 PROCEDURE — 99213 OFFICE O/P EST LOW 20 MIN: CPT

## 2019-01-09 NOTE — PHYSICAL EXAM
[No Acute Distress] : no acute distress [No JVD] : no jugular venous distention [Supple] : supple [No Lymphadenopathy] : no lymphadenopathy [No Respiratory Distress] : no respiratory distress  [Clear to Auscultation] : lungs were clear to auscultation bilaterally [No Accessory Muscle Use] : no accessory muscle use [de-identified] : no brusising or on skin mild tenderness on palpation costal area left side

## 2019-01-09 NOTE — HEALTH RISK ASSESSMENT
[Intercurrent Urgi Care visits] : went to urgent care [] : No [de-identified] : very little [de-identified] : none

## 2019-01-09 NOTE — HISTORY OF PRESENT ILLNESS
[FreeTextEntry1] : f/u urgent care visit left costal margin pain after p/u toy on [place] day [de-identified] : 72 y/o PMHX HTN HLD OA GERD here for f/u urgent care visit for worsening left costal margin pan after bending down to p/u a toy from floor on [place]. pt thought was going to get better but as time progressed got worse and went to urgent care evaluation 12/29/18. Pt on xray read ? fractures 9th and 10th ribs. Pt now here states feels better had fx in past and was a lot worse. pt states got call in regards to biking and states is doing it and is ok. pt now here doing well has cold otherwise ok

## 2019-01-11 ENCOUNTER — FORM ENCOUNTER (OUTPATIENT)
Age: 74
End: 2019-01-11

## 2019-01-12 ENCOUNTER — OUTPATIENT (OUTPATIENT)
Dept: OUTPATIENT SERVICES | Facility: HOSPITAL | Age: 74
LOS: 1 days | End: 2019-01-12
Payer: MEDICARE

## 2019-01-12 ENCOUNTER — APPOINTMENT (OUTPATIENT)
Dept: RADIOLOGY | Facility: HOSPITAL | Age: 74
End: 2019-01-12
Payer: MEDICARE

## 2019-01-12 DIAGNOSIS — Z00.8 ENCOUNTER FOR OTHER GENERAL EXAMINATION: ICD-10-CM

## 2019-01-12 PROCEDURE — 77080 DXA BONE DENSITY AXIAL: CPT | Mod: 26

## 2019-01-12 PROCEDURE — 77080 DXA BONE DENSITY AXIAL: CPT

## 2019-01-16 ENCOUNTER — APPOINTMENT (OUTPATIENT)
Dept: CARDIOLOGY | Facility: CLINIC | Age: 74
End: 2019-01-16
Payer: MEDICARE

## 2019-01-16 VITALS
DIASTOLIC BLOOD PRESSURE: 78 MMHG | OXYGEN SATURATION: 98 % | BODY MASS INDEX: 25.06 KG/M2 | WEIGHT: 185 LBS | HEIGHT: 72 IN | HEART RATE: 68 BPM | SYSTOLIC BLOOD PRESSURE: 160 MMHG | RESPIRATION RATE: 15 BRPM

## 2019-01-16 PROCEDURE — 99213 OFFICE O/P EST LOW 20 MIN: CPT

## 2019-01-16 PROCEDURE — 93000 ELECTROCARDIOGRAM COMPLETE: CPT

## 2019-01-16 PROCEDURE — 93306 TTE W/DOPPLER COMPLETE: CPT

## 2019-01-20 NOTE — DISCUSSION/SUMMARY
[Peripheral Vascular Disease] : peripheral vascular disease [Responding to Treatment] : responding to treatment [Medication Changes Per Orders] : as documented in orders [FreeTextEntry1] : aortic ectasia stable 4.3 cm

## 2019-01-20 NOTE — PHYSICAL EXAM

## 2019-02-21 RX ORDER — POLYETHYLENE GLYCOL-3350 AND ELECTROLYTES 236; 6.74; 5.86; 2.97; 22.74 G/274.31G; G/274.31G; G/274.31G; G/274.31G; G/274.31G
236 POWDER, FOR SOLUTION ORAL
Qty: 1 | Refills: 0 | Status: DISCONTINUED | COMMUNITY
Start: 2017-11-29 | End: 2019-02-21

## 2019-03-20 ENCOUNTER — CLINICAL ADVICE (OUTPATIENT)
Age: 74
End: 2019-03-20

## 2019-04-03 ENCOUNTER — RX RENEWAL (OUTPATIENT)
Age: 74
End: 2019-04-03

## 2019-05-25 ENCOUNTER — RX RENEWAL (OUTPATIENT)
Age: 74
End: 2019-05-25

## 2019-06-12 ENCOUNTER — NON-APPOINTMENT (OUTPATIENT)
Age: 74
End: 2019-06-12

## 2019-06-12 ENCOUNTER — APPOINTMENT (OUTPATIENT)
Dept: CARDIOLOGY | Facility: CLINIC | Age: 74
End: 2019-06-12
Payer: MEDICARE

## 2019-06-12 VITALS
SYSTOLIC BLOOD PRESSURE: 144 MMHG | DIASTOLIC BLOOD PRESSURE: 79 MMHG | OXYGEN SATURATION: 98 % | BODY MASS INDEX: 26.01 KG/M2 | RESPIRATION RATE: 15 BRPM | HEIGHT: 72 IN | HEART RATE: 71 BPM | WEIGHT: 192 LBS

## 2019-06-12 LAB
CHOLEST SERPL-MCNC: 122 MG/DL
CHOLEST/HDLC SERPL: 2.9 RATIO
HDLC SERPL-MCNC: 42 MG/DL
LDLC SERPL CALC-MCNC: 69 MG/DL
TRIGL SERPL-MCNC: 53 MG/DL

## 2019-06-12 PROCEDURE — 99213 OFFICE O/P EST LOW 20 MIN: CPT

## 2019-06-12 PROCEDURE — 93000 ELECTROCARDIOGRAM COMPLETE: CPT

## 2019-06-14 NOTE — DISCUSSION/SUMMARY
[Stable] : stable [Essential Hypertension] : essential hypertension [Responding to Treatment] : responding to treatment [Hyperlipidemia] : hyperlipidemia [Lipids Test Panel] : a fasting lipid profile

## 2019-06-14 NOTE — PHYSICAL EXAM
[Eyelids - No Xanthelasma] : the eyelids demonstrated no xanthelasmas [Normal Conjunctiva] : the conjunctiva exhibited no abnormalities [Normal Oral Mucosa] : normal oral mucosa [No Oral Cyanosis] : no oral cyanosis [No Oral Pallor] : no oral pallor [Normal Jugular Venous A Waves Present] : normal jugular venous A waves present [Normal Jugular Venous V Waves Present] : normal jugular venous V waves present [No Jugular Venous Ruiz A Waves] : no jugular venous ruiz A waves [Respiration, Rhythm And Depth] : normal respiratory rhythm and effort [Auscultation Breath Sounds / Voice Sounds] : lungs were clear to auscultation bilaterally [Exaggerated Use Of Accessory Muscles For Inspiration] : no accessory muscle use [Heart Rate And Rhythm] : heart rate and rhythm were normal [Heart Sounds] : normal S1 and S2 [Murmurs] : no murmurs present [Abdomen Soft] : soft [Abdomen Tenderness] : non-tender [Abdomen Mass (___ Cm)] : no abdominal mass palpated [Abnormal Walk] : normal gait [Nail Clubbing] : no clubbing of the fingernails [Gait - Sufficient For Exercise Testing] : the gait was sufficient for exercise testing [Petechial Hemorrhages (___cm)] : no petechial hemorrhages [Cyanosis, Localized] : no localized cyanosis [Skin Color & Pigmentation] : normal skin color and pigmentation [No Venous Stasis] : no venous stasis [] : no rash [No Skin Ulcers] : no skin ulcer [Skin Lesions] : no skin lesions [Oriented To Time, Place, And Person] : oriented to person, place, and time [No Xanthoma] : no  xanthoma was observed [Affect] : the affect was normal [No Anxiety] : not feeling anxious [Mood] : the mood was normal [FreeTextEntry1] : appears older than stated age

## 2019-06-19 LAB
ALBUMIN SERPL ELPH-MCNC: 4.3 G/DL
ALP BLD-CCNC: 58 U/L
ALT SERPL-CCNC: 15 U/L
ANION GAP SERPL CALC-SCNC: 10 MMOL/L
AST SERPL-CCNC: 17 U/L
BILIRUB SERPL-MCNC: 1.2 MG/DL
BUN SERPL-MCNC: 19 MG/DL
CALCIUM SERPL-MCNC: 9.2 MG/DL
CHLORIDE SERPL-SCNC: 106 MMOL/L
CO2 SERPL-SCNC: 24 MMOL/L
CREAT SERPL-MCNC: 1.15 MG/DL
GLUCOSE SERPL-MCNC: 88 MG/DL
POTASSIUM SERPL-SCNC: 4.4 MMOL/L
PROT SERPL-MCNC: 6.9 G/DL
SODIUM SERPL-SCNC: 140 MMOL/L

## 2019-09-13 ENCOUNTER — APPOINTMENT (OUTPATIENT)
Dept: CARDIOLOGY | Facility: CLINIC | Age: 74
End: 2019-09-13
Payer: MEDICARE

## 2019-09-13 ENCOUNTER — NON-APPOINTMENT (OUTPATIENT)
Age: 74
End: 2019-09-13

## 2019-09-13 VITALS
WEIGHT: 190 LBS | DIASTOLIC BLOOD PRESSURE: 89 MMHG | OXYGEN SATURATION: 100 % | SYSTOLIC BLOOD PRESSURE: 166 MMHG | RESPIRATION RATE: 17 BRPM | BODY MASS INDEX: 25.73 KG/M2 | HEART RATE: 64 BPM | HEIGHT: 72 IN

## 2019-09-13 PROCEDURE — 99213 OFFICE O/P EST LOW 20 MIN: CPT

## 2019-09-13 PROCEDURE — 93000 ELECTROCARDIOGRAM COMPLETE: CPT

## 2019-09-15 ENCOUNTER — RX RENEWAL (OUTPATIENT)
Age: 74
End: 2019-09-15

## 2019-09-15 NOTE — REASON FOR VISIT
[Follow-Up - Clinic] : a clinic follow-up of [Hypertension] : hypertension [Peripheral Vascular Disease] : peripheral vascular disease [Medication Management] : Medication management

## 2019-09-15 NOTE — DISCUSSION/SUMMARY
[Hyperlipidemia] : hyperlipidemia [Lipids Test Panel] : a fasting lipid profile [Stable] : stable [Essential Hypertension] : essential hypertension [Responding to Treatment] : responding to treatment

## 2019-09-15 NOTE — PHYSICAL EXAM
[Eyelids - No Xanthelasma] : the eyelids demonstrated no xanthelasmas [Normal Conjunctiva] : the conjunctiva exhibited no abnormalities [No Oral Pallor] : no oral pallor [Normal Oral Mucosa] : normal oral mucosa [No Oral Cyanosis] : no oral cyanosis [Normal Jugular Venous A Waves Present] : normal jugular venous A waves present [No Jugular Venous Ruiz A Waves] : no jugular venous ruiz A waves [Normal Jugular Venous V Waves Present] : normal jugular venous V waves present [Respiration, Rhythm And Depth] : normal respiratory rhythm and effort [Auscultation Breath Sounds / Voice Sounds] : lungs were clear to auscultation bilaterally [Exaggerated Use Of Accessory Muscles For Inspiration] : no accessory muscle use [Heart Sounds] : normal S1 and S2 [Heart Rate And Rhythm] : heart rate and rhythm were normal [Abdomen Soft] : soft [Murmurs] : no murmurs present [Abdomen Tenderness] : non-tender [Abnormal Walk] : normal gait [Gait - Sufficient For Exercise Testing] : the gait was sufficient for exercise testing [Abdomen Mass (___ Cm)] : no abdominal mass palpated [Cyanosis, Localized] : no localized cyanosis [Nail Clubbing] : no clubbing of the fingernails [Petechial Hemorrhages (___cm)] : no petechial hemorrhages [Skin Color & Pigmentation] : normal skin color and pigmentation [] : no rash [Skin Lesions] : no skin lesions [No Venous Stasis] : no venous stasis [No Skin Ulcers] : no skin ulcer [No Xanthoma] : no  xanthoma was observed [Oriented To Time, Place, And Person] : oriented to person, place, and time [Affect] : the affect was normal [Mood] : the mood was normal [No Anxiety] : not feeling anxious [FreeTextEntry1] : appears older than stated age

## 2019-10-06 ENCOUNTER — RX RENEWAL (OUTPATIENT)
Age: 74
End: 2019-10-06

## 2019-10-07 ENCOUNTER — APPOINTMENT (OUTPATIENT)
Dept: FAMILY MEDICINE | Facility: CLINIC | Age: 74
End: 2019-10-07
Payer: MEDICARE

## 2019-10-07 VITALS
WEIGHT: 194 LBS | TEMPERATURE: 98.4 F | RESPIRATION RATE: 16 BRPM | SYSTOLIC BLOOD PRESSURE: 140 MMHG | DIASTOLIC BLOOD PRESSURE: 84 MMHG | HEART RATE: 71 BPM | BODY MASS INDEX: 26.28 KG/M2 | HEIGHT: 72 IN | OXYGEN SATURATION: 98 %

## 2019-10-07 DIAGNOSIS — Z86.018 PERSONAL HISTORY OF OTHER BENIGN NEOPLASM: ICD-10-CM

## 2019-10-07 DIAGNOSIS — S63.8X1A SPRAIN OF OTHER PART OF RIGHT WRIST AND HAND, INITIAL ENCOUNTER: ICD-10-CM

## 2019-10-07 DIAGNOSIS — L98.9 DISORDER OF THE SKIN AND SUBCUTANEOUS TISSUE, UNSPECIFIED: ICD-10-CM

## 2019-10-07 DIAGNOSIS — M79.641 PAIN IN RIGHT HAND: ICD-10-CM

## 2019-10-07 DIAGNOSIS — M79.604 PAIN IN RIGHT LEG: ICD-10-CM

## 2019-10-07 DIAGNOSIS — R07.81 PLEURODYNIA: ICD-10-CM

## 2019-10-07 DIAGNOSIS — Z87.438 PERSONAL HISTORY OF OTHER DISEASES OF MALE GENITAL ORGANS: ICD-10-CM

## 2019-10-07 DIAGNOSIS — M25.511 PAIN IN RIGHT SHOULDER: ICD-10-CM

## 2019-10-07 DIAGNOSIS — Z87.39 PERSONAL HISTORY OF OTHER DISEASES OF THE MUSCULOSKELETAL SYSTEM AND CONNECTIVE TISSUE: ICD-10-CM

## 2019-10-07 DIAGNOSIS — J35.8 OTHER CHRONIC DISEASES OF TONSILS AND ADENOIDS: ICD-10-CM

## 2019-10-07 DIAGNOSIS — J30.89 OTHER ALLERGIC RHINITIS: ICD-10-CM

## 2019-10-07 DIAGNOSIS — M79.673 PAIN IN UNSPECIFIED FOOT: ICD-10-CM

## 2019-10-07 DIAGNOSIS — M79.605 PAIN IN RIGHT LEG: ICD-10-CM

## 2019-10-07 DIAGNOSIS — Z13.820 ENCOUNTER FOR SCREENING FOR OSTEOPOROSIS: ICD-10-CM

## 2019-10-07 DIAGNOSIS — K21.9 GASTRO-ESOPHAGEAL REFLUX DISEASE W/OUT ESOPHAGITIS: ICD-10-CM

## 2019-10-07 DIAGNOSIS — M17.4 OTHER BILATERAL SECONDARY OSTEOARTHRITIS OF KNEE: ICD-10-CM

## 2019-10-07 LAB
ALBUMIN SERPL ELPH-MCNC: 4.3 G/DL
ALP BLD-CCNC: 66 U/L
ALT SERPL-CCNC: 18 U/L
ANION GAP SERPL CALC-SCNC: 9 MMOL/L
AST SERPL-CCNC: 20 U/L
BASOPHILS # BLD AUTO: 0.02 K/UL
BASOPHILS NFR BLD AUTO: 0.3 %
BILIRUB SERPL-MCNC: 0.9 MG/DL
BUN SERPL-MCNC: 15 MG/DL
CALCIUM SERPL-MCNC: 9.4 MG/DL
CHLORIDE SERPL-SCNC: 101 MMOL/L
CHOLEST SERPL-MCNC: 108 MG/DL
CHOLEST/HDLC SERPL: 3 RATIO
CO2 SERPL-SCNC: 25 MMOL/L
CREAT SERPL-MCNC: 1.09 MG/DL
EOSINOPHIL # BLD AUTO: 0.13 K/UL
EOSINOPHIL NFR BLD AUTO: 2.2 %
ESTIMATED AVERAGE GLUCOSE: 105 MG/DL
GLUCOSE SERPL-MCNC: 95 MG/DL
HBA1C MFR BLD HPLC: 5.3 %
HCT VFR BLD CALC: 42.8 %
HDLC SERPL-MCNC: 36 MG/DL
HGB BLD-MCNC: 14.1 G/DL
IMM GRANULOCYTES NFR BLD AUTO: 0.2 %
LDLC SERPL CALC-MCNC: 62 MG/DL
LYMPHOCYTES # BLD AUTO: 1.72 K/UL
LYMPHOCYTES NFR BLD AUTO: 29.7 %
MAN DIFF?: NORMAL
MCHC RBC-ENTMCNC: 29.5 PG
MCHC RBC-ENTMCNC: 32.9 GM/DL
MCV RBC AUTO: 89.5 FL
MONOCYTES # BLD AUTO: 0.41 K/UL
MONOCYTES NFR BLD AUTO: 7.1 %
NEUTROPHILS # BLD AUTO: 3.5 K/UL
NEUTROPHILS NFR BLD AUTO: 60.5 %
PLATELET # BLD AUTO: 239 K/UL
POTASSIUM SERPL-SCNC: 4.4 MMOL/L
PROT SERPL-MCNC: 6.8 G/DL
RBC # BLD: 4.78 M/UL
RBC # FLD: 13.1 %
SODIUM SERPL-SCNC: 135 MMOL/L
TRIGL SERPL-MCNC: 51 MG/DL
WBC # FLD AUTO: 5.79 K/UL

## 2019-10-07 PROCEDURE — 90686 IIV4 VACC NO PRSV 0.5 ML IM: CPT

## 2019-10-07 PROCEDURE — G0008: CPT

## 2019-10-07 PROCEDURE — 99397 PER PM REEVAL EST PAT 65+ YR: CPT | Mod: 25

## 2019-10-07 NOTE — REVIEW OF SYSTEMS
[Vision Problems] : vision problems [Joint Pain] : joint pain [Joint Stiffness] : joint stiffness [Muscle Pain] : muscle pain [Muscle Weakness] : muscle weakness [Negative] : Heme/Lymph [Joint Swelling] : no joint swelling [de-identified] : cherry angioma [FreeTextEntry9] : bilateral knee pain R>L [FreeTextEntry3] : wears glasses

## 2019-10-07 NOTE — HEALTH RISK ASSESSMENT
[No falls in past year] : Patient reported no falls in the past year [0] : 2) Feeling down, depressed, or hopeless: Not at all (0) [HIV test declined] : HIV test declined [None] : None [Alone] : lives alone [Employed] : employed [Graduate School] : graduate school [Feels Safe at Home] : Feels safe at home [Fully functional (bathing, dressing, toileting, transferring, walking, feeding)] : Fully functional (bathing, dressing, toileting, transferring, walking, feeding) [Fully functional (using the telephone, shopping, preparing meals, housekeeping, doing laundry, using] : Fully functional and needs no help or supervision to perform IADLs (using the telephone, shopping, preparing meals, housekeeping, doing laundry, using transportation, managing medications and managing finances) [Smoke Detector] : smoke detector [Carbon Monoxide Detector] : carbon monoxide detector [Safety elements used in home] : safety elements used in home [Seat Belt] :  uses seat belt [Designated Healthcare Proxy] : Designated healthcare proxy [Relationship: ___] : Relationship: [unfilled] [Very Good] : ~his/her~ current health as very good [Yes] : Yes [] :  [# Of Children ___] : has [unfilled] children [Sexually Active] : sexually active [Name: ___] : Health Care Proxy's Name: [unfilled]  [I will adhere to the patient's wishes as expressed in the advance directive except as noted below.] : I will adhere to the patient's wishes as expressed in the advance directive except as noted below [] : No [de-identified] : no  [de-identified] : very seldom [Hepatitis C test declined] : Hepatitis C test declined [Change in mental status noted] : No change in mental status noted [High Risk Behavior] : no high risk behavior [Reports changes in hearing] : Reports no changes in hearing [Reports changes in vision] : Reports no changes in vision [Reports changes in dental health] : Reports no changes in dental health [Guns at Home] : no guns at home [TB Exposure] : is not being exposed to tuberculosis [Caregiver Concerns] : does not have caregiver concerns [Sunscreen] : does not use sunscreen [de-identified] : slight discomfort and neuropathies in feet and legs [AdvancecareDate] : 10/7/19

## 2019-10-07 NOTE — HISTORY OF PRESENT ILLNESS
[FreeTextEntry1] : CPE  f/u HTN HLD [de-identified] : 73 y/o PMHX HTN DPH HLD PVD AA, GERD,  here for annual exam denies CP SOB palpations leg edema or dizziness Pt no recent ER visit doing well. Pt had stopped PPI and now is getting GERD sx back would like to have it PRN. Pt cold sx no fevers or chills no sick contact. Pt\par Pt c/o c spine pain felt sleep awkward position no radiation no bowel or urine feels fine

## 2020-01-22 ENCOUNTER — NON-APPOINTMENT (OUTPATIENT)
Age: 75
End: 2020-01-22

## 2020-01-22 ENCOUNTER — APPOINTMENT (OUTPATIENT)
Dept: CARDIOLOGY | Facility: CLINIC | Age: 75
End: 2020-01-22
Payer: MEDICARE

## 2020-01-22 VITALS
RESPIRATION RATE: 16 BRPM | HEART RATE: 70 BPM | OXYGEN SATURATION: 98 % | DIASTOLIC BLOOD PRESSURE: 90 MMHG | BODY MASS INDEX: 25.47 KG/M2 | WEIGHT: 188 LBS | HEIGHT: 72 IN | SYSTOLIC BLOOD PRESSURE: 158 MMHG

## 2020-01-22 PROCEDURE — 93000 ELECTROCARDIOGRAM COMPLETE: CPT

## 2020-01-22 PROCEDURE — 99213 OFFICE O/P EST LOW 20 MIN: CPT

## 2020-01-22 PROCEDURE — 93306 TTE W/DOPPLER COMPLETE: CPT

## 2020-01-26 NOTE — PHYSICAL EXAM
[Eyelids - No Xanthelasma] : the eyelids demonstrated no xanthelasmas [Normal Conjunctiva] : the conjunctiva exhibited no abnormalities [Normal Oral Mucosa] : normal oral mucosa [No Oral Pallor] : no oral pallor [No Oral Cyanosis] : no oral cyanosis [Normal Jugular Venous V Waves Present] : normal jugular venous V waves present [Normal Jugular Venous A Waves Present] : normal jugular venous A waves present [No Jugular Venous Ruiz A Waves] : no jugular venous ruiz A waves [Exaggerated Use Of Accessory Muscles For Inspiration] : no accessory muscle use [Respiration, Rhythm And Depth] : normal respiratory rhythm and effort [Auscultation Breath Sounds / Voice Sounds] : lungs were clear to auscultation bilaterally [Heart Sounds] : normal S1 and S2 [Heart Rate And Rhythm] : heart rate and rhythm were normal [Abdomen Soft] : soft [Murmurs] : no murmurs present [Abdomen Tenderness] : non-tender [Abdomen Mass (___ Cm)] : no abdominal mass palpated [Abnormal Walk] : normal gait [Gait - Sufficient For Exercise Testing] : the gait was sufficient for exercise testing [Nail Clubbing] : no clubbing of the fingernails [Cyanosis, Localized] : no localized cyanosis [Petechial Hemorrhages (___cm)] : no petechial hemorrhages [] : no rash [No Venous Stasis] : no venous stasis [Skin Color & Pigmentation] : normal skin color and pigmentation [No Skin Ulcers] : no skin ulcer [Skin Lesions] : no skin lesions [No Xanthoma] : no  xanthoma was observed [Affect] : the affect was normal [Oriented To Time, Place, And Person] : oriented to person, place, and time [Mood] : the mood was normal [No Anxiety] : not feeling anxious [FreeTextEntry1] : appears older than stated age

## 2020-01-26 NOTE — DISCUSSION/SUMMARY
[Hyperlipidemia] : hyperlipidemia [Essential Hypertension] : essential hypertension [Lipids Test Panel] : a fasting lipid profile [Responding to Treatment] : responding to treatment [Peripheral Vascular Disease] : peripheral vascular disease [Stable] : stable

## 2020-03-31 ENCOUNTER — RX RENEWAL (OUTPATIENT)
Age: 75
End: 2020-03-31

## 2020-04-01 ENCOUNTER — RX RENEWAL (OUTPATIENT)
Age: 75
End: 2020-04-01

## 2020-07-22 ENCOUNTER — NON-APPOINTMENT (OUTPATIENT)
Age: 75
End: 2020-07-22

## 2020-07-22 ENCOUNTER — APPOINTMENT (OUTPATIENT)
Dept: CARDIOLOGY | Facility: CLINIC | Age: 75
End: 2020-07-22
Payer: MEDICARE

## 2020-07-22 VITALS
HEART RATE: 65 BPM | WEIGHT: 176 LBS | BODY MASS INDEX: 23.84 KG/M2 | DIASTOLIC BLOOD PRESSURE: 81 MMHG | RESPIRATION RATE: 16 BRPM | TEMPERATURE: 97.6 F | SYSTOLIC BLOOD PRESSURE: 145 MMHG | HEIGHT: 72 IN | OXYGEN SATURATION: 98 %

## 2020-07-22 LAB
ALBUMIN SERPL ELPH-MCNC: 4.4 G/DL
ALP BLD-CCNC: 58 U/L
ALT SERPL-CCNC: 9 U/L
ANION GAP SERPL CALC-SCNC: 10 MMOL/L
AST SERPL-CCNC: 17 U/L
BILIRUB SERPL-MCNC: 1.5 MG/DL
BUN SERPL-MCNC: 20 MG/DL
CALCIUM SERPL-MCNC: 9.5 MG/DL
CHLORIDE SERPL-SCNC: 102 MMOL/L
CHOLEST SERPL-MCNC: 109 MG/DL
CHOLEST/HDLC SERPL: 2.6 RATIO
CO2 SERPL-SCNC: 26 MMOL/L
CREAT SERPL-MCNC: 1.22 MG/DL
ESTIMATED AVERAGE GLUCOSE: 103 MG/DL
GLUCOSE SERPL-MCNC: 93 MG/DL
HBA1C MFR BLD HPLC: 5.2 %
HDLC SERPL-MCNC: 42 MG/DL
LDLC SERPL CALC-MCNC: 58 MG/DL
POTASSIUM SERPL-SCNC: 4.9 MMOL/L
PROT SERPL-MCNC: 6.8 G/DL
SODIUM SERPL-SCNC: 138 MMOL/L
TRIGL SERPL-MCNC: 47 MG/DL

## 2020-07-22 PROCEDURE — 99213 OFFICE O/P EST LOW 20 MIN: CPT

## 2020-07-22 PROCEDURE — 93000 ELECTROCARDIOGRAM COMPLETE: CPT

## 2020-07-22 NOTE — PHYSICAL EXAM
[Normal Conjunctiva] : the conjunctiva exhibited no abnormalities [Eyelids - No Xanthelasma] : the eyelids demonstrated no xanthelasmas [Normal Oral Mucosa] : normal oral mucosa [No Oral Pallor] : no oral pallor [No Oral Cyanosis] : no oral cyanosis [Normal Jugular Venous A Waves Present] : normal jugular venous A waves present [Normal Jugular Venous V Waves Present] : normal jugular venous V waves present [No Jugular Venous Ruiz A Waves] : no jugular venous ruiz A waves [Respiration, Rhythm And Depth] : normal respiratory rhythm and effort [Exaggerated Use Of Accessory Muscles For Inspiration] : no accessory muscle use [Heart Rate And Rhythm] : heart rate and rhythm were normal [Heart Sounds] : normal S1 and S2 [Auscultation Breath Sounds / Voice Sounds] : lungs were clear to auscultation bilaterally [Murmurs] : no murmurs present [Abdomen Tenderness] : non-tender [Abdomen Soft] : soft [Abdomen Mass (___ Cm)] : no abdominal mass palpated [Abnormal Walk] : normal gait [Gait - Sufficient For Exercise Testing] : the gait was sufficient for exercise testing [Nail Clubbing] : no clubbing of the fingernails [Cyanosis, Localized] : no localized cyanosis [Petechial Hemorrhages (___cm)] : no petechial hemorrhages [No Venous Stasis] : no venous stasis [] : no rash [Skin Color & Pigmentation] : normal skin color and pigmentation [Skin Lesions] : no skin lesions [No Skin Ulcers] : no skin ulcer [No Xanthoma] : no  xanthoma was observed [Affect] : the affect was normal [Oriented To Time, Place, And Person] : oriented to person, place, and time [Mood] : the mood was normal [No Anxiety] : not feeling anxious [FreeTextEntry1] : appears older than stated age

## 2020-07-22 NOTE — REASON FOR VISIT
[Aortic Stenosis] : aortic stenosis [Follow-Up - Clinic] : a clinic follow-up of [Hypertension] : hypertension [Medication Management] : Medication management

## 2020-07-22 NOTE — DISCUSSION/SUMMARY
[Essential Hypertension] : essential hypertension [Peripheral Vascular Disease] : peripheral vascular disease [Responding to Treatment] : responding to treatment [Stable] : stable [FreeTextEntry1] : Quality measures \par Tobacco intervention not indicated\par Statin for prevention of cardiovascular disease atorvastatin 10\par Hypertension compensated\par Aspirin for ischemic vascular disease 81\par Tobacco screening cessation and intervention not indicated\par \par Medical necessity\par This is a intermediate risk encounter based upon two or more chronic illnesses

## 2020-09-23 ENCOUNTER — RX RENEWAL (OUTPATIENT)
Age: 75
End: 2020-09-23

## 2020-10-08 ENCOUNTER — APPOINTMENT (OUTPATIENT)
Dept: FAMILY MEDICINE | Facility: CLINIC | Age: 75
End: 2020-10-08
Payer: MEDICARE

## 2020-10-08 VITALS
RESPIRATION RATE: 16 BRPM | DIASTOLIC BLOOD PRESSURE: 70 MMHG | TEMPERATURE: 96.8 F | HEART RATE: 66 BPM | SYSTOLIC BLOOD PRESSURE: 120 MMHG | OXYGEN SATURATION: 96 % | BODY MASS INDEX: 22.65 KG/M2 | WEIGHT: 167 LBS

## 2020-10-08 DIAGNOSIS — R20.2 PARESTHESIA OF SKIN: ICD-10-CM

## 2020-10-08 LAB
ALBUMIN SERPL ELPH-MCNC: 4.2 G/DL
ALP BLD-CCNC: 57 U/L
ALT SERPL-CCNC: 13 U/L
ANION GAP SERPL CALC-SCNC: 8 MMOL/L
AST SERPL-CCNC: 17 U/L
BILIRUB SERPL-MCNC: 1 MG/DL
BUN SERPL-MCNC: 16 MG/DL
CALCIUM SERPL-MCNC: 9.2 MG/DL
CHLORIDE SERPL-SCNC: 105 MMOL/L
CHOLEST SERPL-MCNC: 115 MG/DL
CHOLEST/HDLC SERPL: 2.4 RATIO
CO2 SERPL-SCNC: 27 MMOL/L
CREAT SERPL-MCNC: 1.05 MG/DL
GLUCOSE SERPL-MCNC: 100 MG/DL
HDLC SERPL-MCNC: 48 MG/DL
LDLC SERPL CALC-MCNC: 59 MG/DL
POTASSIUM SERPL-SCNC: 4.5 MMOL/L
PROT SERPL-MCNC: 6.6 G/DL
SODIUM SERPL-SCNC: 140 MMOL/L
TRIGL SERPL-MCNC: 37 MG/DL

## 2020-10-08 PROCEDURE — 90662 IIV NO PRSV INCREASED AG IM: CPT

## 2020-10-08 PROCEDURE — G0008: CPT

## 2020-10-08 PROCEDURE — G0439: CPT

## 2020-10-08 RX ORDER — PANTOPRAZOLE 40 MG/1
40 TABLET, DELAYED RELEASE ORAL
Qty: 30 | Refills: 2 | Status: DISCONTINUED | COMMUNITY
Start: 2017-03-08 | End: 2020-10-08

## 2020-10-08 NOTE — HEALTH RISK ASSESSMENT
[Very Good] : ~his/her~ current health as very good [Yes] : Yes [No falls in past year] : Patient reported no falls in the past year [0] : 2) Feeling down, depressed, or hopeless: Not at all (0) [HIV test declined] : HIV test declined [Hepatitis C test declined] : Hepatitis C test declined [None] : None [Alone] : lives alone [Employed] : employed [Graduate School] : graduate school [] :  [# Of Children ___] : has [unfilled] children [Feels Safe at Home] : Feels safe at home [Fully functional (bathing, dressing, toileting, transferring, walking, feeding)] : Fully functional (bathing, dressing, toileting, transferring, walking, feeding) [Fully functional (using the telephone, shopping, preparing meals, housekeeping, doing laundry, using] : Fully functional and needs no help or supervision to perform IADLs (using the telephone, shopping, preparing meals, housekeeping, doing laundry, using transportation, managing medications and managing finances) [Smoke Detector] : smoke detector [Carbon Monoxide Detector] : carbon monoxide detector [Safety elements used in home] : safety elements used in home [Seat Belt] :  uses seat belt [I will adhere to the patient's wishes as expressed in the advance directive except as noted below.] : I will adhere to the patient's wishes as expressed in the advance directive except as noted below [Designated Healthcare Proxy] : Designated healthcare proxy [Relationship: ___] : Relationship: [unfilled] [Good] : ~his/her~  mood as  good [Patient reported colonoscopy was normal] : Patient reported colonoscopy was normal [Reviewed updated] : Reviewed updated [Name: ___] : Health Care Proxy's Name: [unfilled]  [] : No [de-identified] : no  [de-identified] : very seldom [Change in mental status noted] : No change in mental status noted [Sexually Active] : not sexually active [High Risk Behavior] : no high risk behavior [Reports changes in hearing] : Reports no changes in hearing [Reports changes in vision] : Reports no changes in vision [Reports changes in dental health] : Reports no changes in dental health [Guns at Home] : no guns at home [Sunscreen] : does not use sunscreen [TB Exposure] : is not being exposed to tuberculosis [Caregiver Concerns] : does not have caregiver concerns [ColonoscopyDate] : 3/5/2018 [ColonoscopyComments] : repeat 5 years DR Ramirez [de-identified] : slight discomfort and neuropathies in feet and legs [de-identified] : bifocal glasses

## 2020-10-08 NOTE — REVIEW OF SYSTEMS
[Vision Problems] : vision problems [Joint Pain] : joint pain [Joint Stiffness] : joint stiffness [Muscle Pain] : muscle pain [Muscle Weakness] : muscle weakness [Negative] : Heme/Lymph [Joint Swelling] : no joint swelling [FreeTextEntry3] : wears glasses [FreeTextEntry9] : bilateral knee pain R>L [de-identified] : cherry angioma

## 2020-10-08 NOTE — HISTORY OF PRESENT ILLNESS
[FreeTextEntry1] : CPE  f/u HTN HLD [de-identified] : 74 y/o PMHX HTN DPH HLD PVD AA, GERD,  here for annual exam denies CP SOB palpations leg edema or dizziness  Pt notes his legs despite using support stockings  still feels not helping much when he bikes feels much better.  No bowel or bladder issues doing well otherwise. pt no pain today just tingling feet\par Pt very active states is trying to loose weight and has accomplished it states feels much better and has less knee pain after weight loss\par

## 2020-11-19 ENCOUNTER — TRANSCRIPTION ENCOUNTER (OUTPATIENT)
Age: 75
End: 2020-11-19

## 2021-01-29 ENCOUNTER — APPOINTMENT (OUTPATIENT)
Dept: CARDIOLOGY | Facility: CLINIC | Age: 76
End: 2021-01-29
Payer: MEDICARE

## 2021-01-29 VITALS
TEMPERATURE: 97.2 F | RESPIRATION RATE: 20 BRPM | HEIGHT: 72 IN | WEIGHT: 170 LBS | SYSTOLIC BLOOD PRESSURE: 135 MMHG | BODY MASS INDEX: 23.03 KG/M2 | HEART RATE: 63 BPM | OXYGEN SATURATION: 98 % | DIASTOLIC BLOOD PRESSURE: 79 MMHG

## 2021-01-29 PROCEDURE — 93000 ELECTROCARDIOGRAM COMPLETE: CPT

## 2021-01-29 PROCEDURE — 99213 OFFICE O/P EST LOW 20 MIN: CPT | Mod: 25

## 2021-01-29 PROCEDURE — 99072 ADDL SUPL MATRL&STAF TM PHE: CPT

## 2021-01-29 PROCEDURE — 93306 TTE W/DOPPLER COMPLETE: CPT

## 2021-01-30 NOTE — REASON FOR VISIT
[Follow-Up - Clinic] : a clinic follow-up of [Aortic Stenosis] : aortic stenosis [Hypertension] : hypertension [Medication Management] : Medication management [Peripheral Vascular Disease] : peripheral vascular disease

## 2021-01-30 NOTE — DISCUSSION/SUMMARY
[Essential Hypertension] : essential hypertension [Peripheral Vascular Disease] : peripheral vascular disease [Stable] : stable [Responding to Treatment] : responding to treatment [FreeTextEntry1] : Quality measures \par Tobacco intervention not indicated\par Statin for prevention of cardiovascular disease atorvastatin 10\par Hypertension compensated\par Aspirin for ischemic vascular disease 81\par Tobacco screening cessation and intervention not indicated\par \par Medical necessity\par This is a intermediate risk encounter based upon two or more chronic illnesses

## 2021-01-30 NOTE — PHYSICAL EXAM
[Normal Conjunctiva] : the conjunctiva exhibited no abnormalities [Eyelids - No Xanthelasma] : the eyelids demonstrated no xanthelasmas [Normal Oral Mucosa] : normal oral mucosa [No Oral Pallor] : no oral pallor [No Oral Cyanosis] : no oral cyanosis [Normal Jugular Venous A Waves Present] : normal jugular venous A waves present [Normal Jugular Venous V Waves Present] : normal jugular venous V waves present [No Jugular Venous Ruiz A Waves] : no jugular venous ruiz A waves [Respiration, Rhythm And Depth] : normal respiratory rhythm and effort [Exaggerated Use Of Accessory Muscles For Inspiration] : no accessory muscle use [Auscultation Breath Sounds / Voice Sounds] : lungs were clear to auscultation bilaterally [Heart Sounds] : normal S1 and S2 [Heart Rate And Rhythm] : heart rate and rhythm were normal [Murmurs] : no murmurs present [Abdomen Soft] : soft [Abdomen Tenderness] : non-tender [Abdomen Mass (___ Cm)] : no abdominal mass palpated [Abnormal Walk] : normal gait [Gait - Sufficient For Exercise Testing] : the gait was sufficient for exercise testing [Nail Clubbing] : no clubbing of the fingernails [Cyanosis, Localized] : no localized cyanosis [Petechial Hemorrhages (___cm)] : no petechial hemorrhages [Skin Color & Pigmentation] : normal skin color and pigmentation [] : no rash [No Venous Stasis] : no venous stasis [No Skin Ulcers] : no skin ulcer [Skin Lesions] : no skin lesions [No Xanthoma] : no  xanthoma was observed [Oriented To Time, Place, And Person] : oriented to person, place, and time [Affect] : the affect was normal [Mood] : the mood was normal [No Anxiety] : not feeling anxious [FreeTextEntry1] : appears older than stated age

## 2021-03-03 ENCOUNTER — TRANSCRIPTION ENCOUNTER (OUTPATIENT)
Age: 76
End: 2021-03-03

## 2021-03-26 ENCOUNTER — APPOINTMENT (OUTPATIENT)
Dept: CARDIOLOGY | Facility: CLINIC | Age: 76
End: 2021-03-26
Payer: MEDICARE

## 2021-03-26 ENCOUNTER — RX RENEWAL (OUTPATIENT)
Age: 76
End: 2021-03-26

## 2021-03-26 ENCOUNTER — NON-APPOINTMENT (OUTPATIENT)
Age: 76
End: 2021-03-26

## 2021-03-26 VITALS
OXYGEN SATURATION: 97 % | BODY MASS INDEX: 23.84 KG/M2 | HEART RATE: 74 BPM | SYSTOLIC BLOOD PRESSURE: 126 MMHG | TEMPERATURE: 97.6 F | RESPIRATION RATE: 20 BRPM | HEIGHT: 72 IN | DIASTOLIC BLOOD PRESSURE: 75 MMHG | WEIGHT: 176 LBS

## 2021-03-26 DIAGNOSIS — I49.9 CARDIAC ARRHYTHMIA, UNSPECIFIED: ICD-10-CM

## 2021-03-26 PROCEDURE — 99072 ADDL SUPL MATRL&STAF TM PHE: CPT

## 2021-03-26 PROCEDURE — 99213 OFFICE O/P EST LOW 20 MIN: CPT

## 2021-03-26 PROCEDURE — 93000 ELECTROCARDIOGRAM COMPLETE: CPT

## 2021-03-27 PROBLEM — I49.9 IRREGULAR HEART BEAT: Status: ACTIVE | Noted: 2021-03-25

## 2021-03-27 NOTE — DISCUSSION/SUMMARY
[Essential Hypertension] : essential hypertension [Peripheral Vascular Disease] : peripheral vascular disease [Stable] : stable [Responding to Treatment] : responding to treatment [FreeTextEntry1] : Stanislav will monitor his arrhythmia with a smart phone alive core cardio mobile\par \par Quality measures \par Tobacco intervention not indicated\par Statin for prevention of cardiovascular disease atorvastatin 10\par Hypertension compensated\par Aspirin for ischemic vascular disease 81\par Tobacco screening cessation and intervention not indicated\par \par Medical necessity\par This is a intermediate risk encounter based upon two or more chronic illnesses

## 2021-03-27 NOTE — HISTORY OF PRESENT ILLNESS
[FreeTextEntry1] : no new complaint for stanislav doing well Stanislav recently noted irregular heartbeats

## 2021-03-27 NOTE — REASON FOR VISIT
[Follow-Up - Clinic] : a clinic follow-up of [Aortic Stenosis] : aortic stenosis [Hypertension] : hypertension [Medication Management] : Medication management [Peripheral Vascular Disease] : peripheral vascular disease [Supraventricular Tachycardia] : supraventricular tachycardia

## 2021-05-25 ENCOUNTER — RX RENEWAL (OUTPATIENT)
Age: 76
End: 2021-05-25

## 2021-06-13 ENCOUNTER — TRANSCRIPTION ENCOUNTER (OUTPATIENT)
Age: 76
End: 2021-06-13

## 2021-06-13 ENCOUNTER — EMERGENCY (EMERGENCY)
Facility: HOSPITAL | Age: 76
LOS: 1 days | Discharge: HOME | End: 2021-06-13
Attending: EMERGENCY MEDICINE | Admitting: EMERGENCY MEDICINE
Payer: MEDICARE

## 2021-06-13 VITALS
TEMPERATURE: 99 F | HEIGHT: 72 IN | RESPIRATION RATE: 20 BRPM | SYSTOLIC BLOOD PRESSURE: 151 MMHG | OXYGEN SATURATION: 98 % | HEART RATE: 70 BPM | DIASTOLIC BLOOD PRESSURE: 81 MMHG | WEIGHT: 174.83 LBS

## 2021-06-13 VITALS
OXYGEN SATURATION: 98 % | DIASTOLIC BLOOD PRESSURE: 70 MMHG | SYSTOLIC BLOOD PRESSURE: 134 MMHG | HEART RATE: 78 BPM | RESPIRATION RATE: 18 BRPM

## 2021-06-13 PROCEDURE — 99284 EMERGENCY DEPT VISIT MOD MDM: CPT | Mod: 25

## 2021-06-13 PROCEDURE — 70450 CT HEAD/BRAIN W/O DYE: CPT | Mod: MA

## 2021-06-13 PROCEDURE — 71101 X-RAY EXAM UNILAT RIBS/CHEST: CPT | Mod: 26

## 2021-06-13 PROCEDURE — 71101 X-RAY EXAM UNILAT RIBS/CHEST: CPT

## 2021-06-13 PROCEDURE — 99284 EMERGENCY DEPT VISIT MOD MDM: CPT

## 2021-06-13 PROCEDURE — 70450 CT HEAD/BRAIN W/O DYE: CPT | Mod: 26,MA

## 2021-06-13 RX ORDER — ONDANSETRON 8 MG/1
4 TABLET, FILM COATED ORAL ONCE
Refills: 0 | Status: DISCONTINUED | OUTPATIENT
Start: 2021-06-13 | End: 2021-06-13

## 2021-06-13 RX ORDER — LIDOCAINE 4 G/100G
1 CREAM TOPICAL ONCE
Refills: 0 | Status: COMPLETED | OUTPATIENT
Start: 2021-06-13 | End: 2021-06-13

## 2021-06-13 RX ORDER — MORPHINE SULFATE 50 MG/1
4 CAPSULE, EXTENDED RELEASE ORAL ONCE
Refills: 0 | Status: DISCONTINUED | OUTPATIENT
Start: 2021-06-13 | End: 2021-06-13

## 2021-06-13 RX ORDER — ACETAMINOPHEN 500 MG
650 TABLET ORAL ONCE
Refills: 0 | Status: COMPLETED | OUTPATIENT
Start: 2021-06-13 | End: 2021-06-13

## 2021-06-13 RX ORDER — SODIUM CHLORIDE 9 MG/ML
1000 INJECTION INTRAMUSCULAR; INTRAVENOUS; SUBCUTANEOUS ONCE
Refills: 0 | Status: DISCONTINUED | OUTPATIENT
Start: 2021-06-13 | End: 2021-06-13

## 2021-06-13 RX ORDER — FAMOTIDINE 10 MG/ML
20 INJECTION INTRAVENOUS DAILY
Refills: 0 | Status: DISCONTINUED | OUTPATIENT
Start: 2021-06-13 | End: 2021-06-13

## 2021-06-13 RX ADMIN — LIDOCAINE 1 PATCH: 4 CREAM TOPICAL at 16:16
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
## 2021-06-13 NOTE — ED PROVIDER NOTE - CARE PLAN
Principal Discharge DX:	Rib contusion, right, initial encounter  Secondary Diagnosis:	Head trauma, initial encounter
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
## 2021-06-13 NOTE — ED PROVIDER NOTE - NSFOLLOWUPINSTRUCTIONS_ED_ALL_ED_FT
Follow up with your PMD within 24-48 hrs   Rest, apply ice to your ribs for 10-20 minutes as needed for pain 2-3 times a day.   Use the incentive spirometer given to you as directed.   Take Tylenol 650mg every 4-6 hours as needed for pain.  Return to the emergency department with ANY worsening or new concerning symptoms.     Head Injury, Adult:    There are many types of head injuries. Head injuries can be as minor as a bump, or they can be more severe. More severe head injuries include:  A jarring injury to the brain (concussion).  A bruise of the brain (contusion). This means there is bleeding in the brain that can cause swelling.  A cracked skull (skull fracture).  Bleeding in the brain that collects, clots, and forms a bump (hematoma).  After a head injury, you may need to be observed for a while in the emergency department or urgent care. Sometimes admission to the hospital is needed.    After a head injury has happened, most problems occur within the first 24 hours, but side effects may occur up to 7–10 days after the injury. It is important to watch your condition for any changes.    What are the causes?  There are many possible causes of a head injury. A serious head injury may happen to someone who is in a car accident (motor vehicle collision). Other causes of major head injuries include bicycle or motorcycle accidents, sports injuries, and falls.    Risk factors  This condition is more likely to occur in people who:  Drink a lot of alcohol or use drugs.  Are over the age of 65.  Are at risk for falls.  What are the symptoms?  There are many possible symptoms of a head injury. Visible symptoms of a head injury include a bruise, bump, or bleeding at the site of the injury. Other non-visible symptoms include:  Feeling sleepy or not being able to stay awake.  Passing out.  Headache.  Seizures.  Dizziness.  Confusion.  Memory problems.  Nausea or vomiting.  Other possible symptoms that may develop after the head injury include:  Poor attention and concentration.  Fatigue or tiring easily.  Irritability.  Being uncomfortable around bright lights or loud noises.  Anxiety or depression.  Disturbed sleep.  How is this diagnosed?  This condition can usually be diagnosed based on your symptoms, a description of the injury, and a physical exam. You may also have imaging tests done, such as a CT scan or MRI. You will also be closely watched.    How is this treated?  Treatment for this condition depends on the severity and type of injury you have. The main goal of treatment is to prevent complications and allow the brain time to heal.    For mild head injury, you may be sent home and treatment may include:  Observation. A responsible adult should stay with you for 24 hours after your injury and check on you often.  Physical rest.  Brain rest.  Pain medicines.  For severe brain injury, treatment may include:  Close observation. This includes hospitalization with frequent physical exams. You may need to go to a hospital that specializes in head injury.  Pain medicines.  Breathing support. This may include using a ventilator.  Managing the pressure inside the brain (intracranial pressure, or ICP). This may include:  Monitoring the ICP.  Giving medicines to decrease the ICP.  Positioning you to decrease the ICP.  Medicine to prevent seizures.  Surgery to stop bleeding or to remove blood clots (craniotomy).  Surgery to remove part of the skull (decompressive craniectomy). This allows room for the brain to swell.  Follow these instructions at home:  Activity     Rest as much as possible and avoid activities that are physically hard or tiring.  Make sure you get enough sleep.  Limit activities that require a lot of thought or attention, such as:  Watching TV.  Playing memory games and puzzles.  Job-related work or homework.  Working on the computer, social media, and texting.  Avoid activities that could cause another head injury, such as playing sports, until your health care provider approves. Having another head injury, especially before the first one has healed, can be dangerous.  Ask your health care provider when it is safe for you to return to your regular activities, including work or school. Ask your health care provider for a step-by-step plan for gradually returning to activities.  Ask your health care provider when you can drive, ride a bicycle, or use heavy machinery. Your ability to react may be slower after a brain injury. Never do these activities if you are dizzy.  Lifestyle     Do not drink alcohol until your health care provider approves, and avoid drug use. Alcohol and certain drugs may slow your recovery and can put you at risk of further injury.  If it is harder than usual to remember things, write them down.  If you are easily distracted, try to do one thing at a time.  Talk with family members or close friends when making important decisions.  Tell your friends, family, a trusted colleague, and  about your injury, symptoms, and restrictions. Have them watch for any new or worsening problems.  General instructions     Take over-the-counter and prescription medicines only as told by your health care provider.  Have someone stay with you for 24 hours after your head injury. This person should watch you for any changes in your symptoms and be ready to seek medical help, as needed.  Keep all follow-up visits as told by your health care provider. This is important.  How is this prevented?  Work on improving your balance and strength to avoid falls.  Wear a seatbelt when you are in a moving vehicle.  Wear a helmet when riding a bicycle, skiing, or doing any other sport or activity that has a risk of injury.  Drink alcohol only in moderation.  Take safety measures in your home, such as:  Removing clutter and tripping hazards from floors and stairways.  Using grab bars in bathrooms and handrails by stairs.  Placing non-slip mats on floors and in bathtubs.  Improving lighting in dim areas.  Get help right away if:  You have:  A severe headache that is not helped by medicine.  Trouble walking, have weakness in your arms and legs, or lose your balance.  Clear or bloody fluid coming from your nose or ears.  Changes in your vision.  A seizure.  You vomit.  Your symptoms get worse.  Your speech is slurred.  You pass out.  You are sleepier and have trouble staying awake.  Your pupils change size.  These symptoms may represent a serious problem that is an emergency. Do not wait to see if the symptoms will go away. Get medical help right away. Call your local emergency services (911 in the [place] Do not drive yourself to the hospital.     This information is not intended to replace advice given to you by your health care provider. Make sure you discuss any questions you have with your health care provider. Follow up with your PMD within 24-48 hrs   Rest, apply ice to your ribs for 10-20 minutes as needed for pain 2-3 times a day.   Use the incentive spirometer given to you as directed.   Take Tylenol 650mg every 4-6 hours as needed for pain.  Return to the emergency department with ANY worsening or new concerning symptoms.     Head Injury, Adult:    There are many types of head injuries. Head injuries can be as minor as a bump, or they can be more severe. More severe head injuries include:  A jarring injury to the brain (concussion).  A bruise of the brain (contusion). This means there is bleeding in the brain that can cause swelling.  A cracked skull (skull fracture).  Bleeding in the brain that collects, clots, and forms a bump (hematoma).  After a head injury, you may need to be observed for a while in the emergency department or urgent care. Sometimes admission to the hospital is needed.    After a head injury has happened, most problems occur within the first 24 hours, but side effects may occur up to 7–10 days after the injury. It is important to watch your condition for any changes.    What are the causes?  There are many possible causes of a head injury. A serious head injury may happen to someone who is in a car accident (motor vehicle collision). Other causes of major head injuries include bicycle or motorcycle accidents, sports injuries, and falls.    Risk factors  This condition is more likely to occur in people who:  Drink a lot of alcohol or use drugs.  Are over the age of 65.  Are at risk for falls.  What are the symptoms?  There are many possible symptoms of a head injury. Visible symptoms of a head injury include a bruise, bump, or bleeding at the site of the injury. Other non-visible symptoms include:  Feeling sleepy or not being able to stay awake.  Passing out.  Headache.  Seizures.  Dizziness.  Confusion.  Memory problems.  Nausea or vomiting.  Other possible symptoms that may develop after the head injury include:  Poor attention and concentration.  Fatigue or tiring easily.  Irritability.  Being uncomfortable around bright lights or loud noises.  Anxiety or depression.  Disturbed sleep.  How is this diagnosed?  This condition can usually be diagnosed based on your symptoms, a description of the injury, and a physical exam. You may also have imaging tests done, such as a CT scan or MRI. You will also be closely watched.    How is this treated?  Treatment for this condition depends on the severity and type of injury you have. The main goal of treatment is to prevent complications and allow the brain time to heal.    For mild head injury, you may be sent home and treatment may include:  Observation. A responsible adult should stay with you for 24 hours after your injury and check on you often.  Physical rest.  Brain rest.  Pain medicines.  For severe brain injury, treatment may include:  Close observation. This includes hospitalization with frequent physical exams. You may need to go to a hospital that specializes in head injury.  Pain medicines.  Breathing support. This may include using a ventilator.  Managing the pressure inside the brain (intracranial pressure, or ICP). This may include:  Monitoring the ICP.  Giving medicines to decrease the ICP.  Positioning you to decrease the ICP.  Medicine to prevent seizures.  Surgery to stop bleeding or to remove blood clots (craniotomy).  Surgery to remove part of the skull (decompressive craniectomy). This allows room for the brain to swell.  Follow these instructions at home:  Activity     Rest as much as possible and avoid activities that are physically hard or tiring.  Make sure you get enough sleep.  Limit activities that require a lot of thought or attention, such as:  Watching TV.  Playing memory games and puzzles.  Job-related work or homework.  Working on the computer, social media, and texting.  Avoid activities that could cause another head injury, such as playing sports, until your health care provider approves. Having another head injury, especially before the first one has healed, can be dangerous.  Ask your health care provider when it is safe for you to return to your regular activities, including work or school. Ask your health care provider for a step-by-step plan for gradually returning to activities.  Ask your health care provider when you can drive, ride a bicycle, or use heavy machinery. Your ability to react may be slower after a brain injury. Never do these activities if you are dizzy.  Lifestyle     Do not drink alcohol until your health care provider approves, and avoid drug use. Alcohol and certain drugs may slow your recovery and can put you at risk of further injury.  If it is harder than usual to remember things, write them down.  If you are easily distracted, try to do one thing at a time.  Talk with family members or close friends when making important decisions.  Tell your friends, family, a trusted colleague, and  about your injury, symptoms, and restrictions. Have them watch for any new or worsening problems.  General instructions     Take over-the-counter and prescription medicines only as told by your health care provider.  Have someone stay with you for 24 hours after your head injury. This person should watch you for any changes in your symptoms and be ready to seek medical help, as needed.  Keep all follow-up visits as told by your health care provider. This is important.  How is this prevented?  Work on improving your balance and strength to avoid falls.  Wear a seatbelt when you are in a moving vehicle.  Wear a helmet when riding a bicycle, skiing, or doing any other sport or activity that has a risk of injury.  Drink alcohol only in moderation.  Take safety measures in your home, such as:  Removing clutter and tripping hazards from floors and stairways.  Using grab bars in bathrooms and handrails by stairs.  Placing non-slip mats on floors and in bathtubs.  Improving lighting in dim areas.  Get help right away if:  You have:  A severe headache that is not helped by medicine.  Trouble walking, have weakness in your arms and legs, or lose your balance.  Clear or bloody fluid coming from your nose or ears.  Changes in your vision.  A seizure.  You vomit.  Your symptoms get worse.  Your speech is slurred.  You pass out.  You are sleepier and have trouble staying awake.  Your pupils change size.  These symptoms may represent a serious problem that is an emergency. Do not wait to see if the symptoms will go away. Get medical help right away. Call your local emergency services (911 in the [place] Do not drive yourself to the hospital.     This information is not intended to replace advice given to you by your health care provider. Make sure you discuss any questions you have with your health care provider.  Apply 1 lidocaine 4% patch to ribs every 12 hours.

## 2021-06-13 NOTE — ED PROVIDER NOTE - PATIENT PORTAL LINK FT
You can access the FollowMyHealth Patient Portal offered by [facility] by registering at the following website: [email or web address]. By joining [facility]’s FollowMyHealth portal, you will also be able to view your health information using other applications (apps) compatible with our system.

## 2021-06-14 ENCOUNTER — TRANSCRIPTION ENCOUNTER (OUTPATIENT)
Age: 76
End: 2021-06-14

## 2021-06-15 ENCOUNTER — NON-APPOINTMENT (OUTPATIENT)
Age: 76
End: 2021-06-15

## 2021-06-24 ENCOUNTER — APPOINTMENT (OUTPATIENT)
Dept: FAMILY MEDICINE | Facility: CLINIC | Age: 76
End: 2021-06-24

## 2021-07-28 ENCOUNTER — APPOINTMENT (OUTPATIENT)
Dept: CARDIOLOGY | Facility: CLINIC | Age: 76
End: 2021-07-28
Payer: MEDICARE

## 2021-07-28 ENCOUNTER — APPOINTMENT (OUTPATIENT)
Dept: CARDIOLOGY | Facility: CLINIC | Age: 76
End: 2021-07-28

## 2021-07-28 ENCOUNTER — TRANSCRIPTION ENCOUNTER (OUTPATIENT)
Age: 76
End: 2021-07-28

## 2021-07-28 ENCOUNTER — NON-APPOINTMENT (OUTPATIENT)
Age: 76
End: 2021-07-28

## 2021-07-28 VITALS
SYSTOLIC BLOOD PRESSURE: 144 MMHG | TEMPERATURE: 97.4 F | WEIGHT: 182 LBS | DIASTOLIC BLOOD PRESSURE: 74 MMHG | RESPIRATION RATE: 20 BRPM | BODY MASS INDEX: 24.65 KG/M2 | OXYGEN SATURATION: 97 % | HEIGHT: 72 IN | HEART RATE: 73 BPM

## 2021-07-28 PROCEDURE — 99072 ADDL SUPL MATRL&STAF TM PHE: CPT

## 2021-07-28 PROCEDURE — 93000 ELECTROCARDIOGRAM COMPLETE: CPT

## 2021-07-28 PROCEDURE — 99213 OFFICE O/P EST LOW 20 MIN: CPT

## 2021-07-29 NOTE — DISCUSSION/SUMMARY
[Hypertension] : hypertension [Essential Hypertension] : essential hypertension [Peripheral Vascular Disease] : peripheral vascular disease [Stable] : stable [Responding to Treatment] : responding to treatment

## 2021-07-29 NOTE — PHYSICAL EXAM
[Well Developed] : well developed [Well Nourished] : well nourished [No Acute Distress] : no acute distress [Normal Venous Pressure] : normal venous pressure [No Carotid Bruit] : no carotid bruit [Normal S1, S2] : normal S1, S2 [No Murmur] : no murmur [No Rub] : no rub [No Gallop] : no gallop [Normal] : clear lung fields, good air entry, no respiratory distress [Clear Lung Fields] : clear lung fields [Good Air Entry] : good air entry [No Respiratory Distress] : no respiratory distress  [Soft] : abdomen soft [Non Tender] : non-tender [No Masses/organomegaly] : no masses/organomegaly [Normal Bowel Sounds] : normal bowel sounds [Normal Gait] : normal gait [No Edema] : no edema [No Cyanosis] : no cyanosis [No Clubbing] : no clubbing [No Varicosities] : no varicosities [No Rash] : no rash [No Skin Lesions] : no skin lesions [Moves all extremities] : moves all extremities [No Focal Deficits] : no focal deficits [Normal Speech] : normal speech [Alert and Oriented] : alert and oriented [Normal memory] : normal memory [Normal Conjunctiva] : the conjunctiva exhibited no abnormalities [Eyelids - No Xanthelasma] : the eyelids demonstrated no xanthelasmas [Normal Oral Mucosa] : normal oral mucosa [No Oral Pallor] : no oral pallor [No Oral Cyanosis] : no oral cyanosis [Normal Jugular Venous A Waves Present] : normal jugular venous A waves present [Normal Jugular Venous V Waves Present] : normal jugular venous V waves present [No Jugular Venous Ruiz A Waves] : no jugular venous ruiz A waves [Respiration, Rhythm And Depth] : normal respiratory rhythm and effort [Exaggerated Use Of Accessory Muscles For Inspiration] : no accessory muscle use [Auscultation Breath Sounds / Voice Sounds] : lungs were clear to auscultation bilaterally [Heart Rate And Rhythm] : heart rate and rhythm were normal [Heart Sounds] : normal S1 and S2 [Murmurs] : no murmurs present [Abdomen Soft] : soft [Abdomen Tenderness] : non-tender [Abdomen Mass (___ Cm)] : no abdominal mass palpated [Abnormal Walk] : normal gait [Gait - Sufficient For Exercise Testing] : the gait was sufficient for exercise testing [Nail Clubbing] : no clubbing of the fingernails [Cyanosis, Localized] : no localized cyanosis [Petechial Hemorrhages (___cm)] : no petechial hemorrhages [Skin Color & Pigmentation] : normal skin color and pigmentation [] : no rash [No Venous Stasis] : no venous stasis [Skin Lesions] : no skin lesions [No Skin Ulcers] : no skin ulcer [No Xanthoma] : no  xanthoma was observed [Oriented To Time, Place, And Person] : oriented to person, place, and time [Affect] : the affect was normal [Mood] : the mood was normal [No Anxiety] : not feeling anxious [de-identified] : cough [FreeTextEntry1] : appears older than stated age

## 2021-09-28 ENCOUNTER — RX RENEWAL (OUTPATIENT)
Age: 76
End: 2021-09-28

## 2021-10-08 ENCOUNTER — APPOINTMENT (OUTPATIENT)
Dept: FAMILY MEDICINE | Facility: CLINIC | Age: 76
End: 2021-10-08
Payer: MEDICARE

## 2021-10-08 VITALS
OXYGEN SATURATION: 98 % | DIASTOLIC BLOOD PRESSURE: 78 MMHG | HEART RATE: 72 BPM | SYSTOLIC BLOOD PRESSURE: 148 MMHG | RESPIRATION RATE: 16 BRPM | TEMPERATURE: 97.3 F | HEIGHT: 72 IN | WEIGHT: 184 LBS | BODY MASS INDEX: 24.92 KG/M2

## 2021-10-08 DIAGNOSIS — I25.10 ATHEROSCLEROTIC HEART DISEASE OF NATIVE CORONARY ARTERY W/OUT ANGINA PECTORIS: ICD-10-CM

## 2021-10-08 PROCEDURE — G0439: CPT

## 2021-10-08 PROCEDURE — 90662 IIV NO PRSV INCREASED AG IM: CPT

## 2021-10-08 PROCEDURE — G0008: CPT
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
## 2021-10-08 NOTE — REVIEW OF SYSTEMS
[Vision Problems] : vision problems [Joint Pain] : joint pain [Joint Stiffness] : joint stiffness [Muscle Pain] : muscle pain [Muscle Weakness] : muscle weakness [Negative] : Heme/Lymph [Joint Swelling] : no joint swelling [FreeTextEntry3] : wears glasses [FreeTextEntry9] : bilateral knee pain R>L [de-identified] : cherry angioma

## 2021-10-08 NOTE — PHYSICAL EXAM
[Well Nourished] : well nourished [Well Developed] : well developed [Well-Appearing] : well-appearing [Normal Sclera/Conjunctiva] : normal sclera/conjunctiva [PERRL] : pupils equal round and reactive to light [EOMI] : extraocular movements intact [Normal Outer Ear/Nose] : the outer ears and nose were normal in appearance [Normal Oropharynx] : the oropharynx was normal [No JVD] : no jugular venous distention [No Lymphadenopathy] : no lymphadenopathy [Supple] : supple [Thyroid Normal, No Nodules] : the thyroid was normal and there were no nodules present [No Respiratory Distress] : no respiratory distress  [No Accessory Muscle Use] : no accessory muscle use [Clear to Auscultation] : lungs were clear to auscultation bilaterally [Normal Rate] : normal rate  [Regular Rhythm] : with a regular rhythm [Normal S1, S2] : normal S1 and S2 [No Murmur] : no murmur heard [No Carotid Bruits] : no carotid bruits [No Abdominal Bruit] : a ~M bruit was not heard ~T in the abdomen [No Varicosities] : no varicosities [No Edema] : there was no peripheral edema [Pedal Pulses Present] : the pedal pulses are present [No Palpable Aorta] : no palpable aorta [No Extremity Clubbing/Cyanosis] : no extremity clubbing/cyanosis [Soft] : abdomen soft [Non Tender] : non-tender [Non-distended] : non-distended [No Masses] : no abdominal mass palpated [No HSM] : no HSM [Normal Bowel Sounds] : normal bowel sounds [Normal Posterior Cervical Nodes] : no posterior cervical lymphadenopathy [Normal Anterior Cervical Nodes] : no anterior cervical lymphadenopathy [No CVA Tenderness] : no CVA  tenderness [No Spinal Tenderness] : no spinal tenderness [No Joint Swelling] : no joint swelling [Grossly Normal Strength/Tone] : grossly normal strength/tone [No Rash] : no rash [Coordination Grossly Intact] : coordination grossly intact [No Focal Deficits] : no focal deficits [Normal Gait] : normal gait [Deep Tendon Reflexes (DTR)] : deep tendon reflexes were 2+ and symmetric [Normal Affect] : the affect was normal [Normal Insight/Judgement] : insight and judgment were intact
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
## 2021-10-12 LAB
ALBUMIN SERPL ELPH-MCNC: 4.3 G/DL
ALP BLD-CCNC: 74 U/L
ALT SERPL-CCNC: 11 U/L
ANION GAP SERPL CALC-SCNC: 10 MMOL/L
AST SERPL-CCNC: 15 U/L
BILIRUB SERPL-MCNC: 1.3 MG/DL
BUN SERPL-MCNC: 16 MG/DL
CALCIUM SERPL-MCNC: 9.1 MG/DL
CHLORIDE SERPL-SCNC: 106 MMOL/L
CHOLEST SERPL-MCNC: 122 MG/DL
CO2 SERPL-SCNC: 23 MMOL/L
CREAT SERPL-MCNC: 1.09 MG/DL
GLUCOSE SERPL-MCNC: 103 MG/DL
HCV AB SER QL: NONREACTIVE
HCV S/CO RATIO: 0.14 S/CO
HDLC SERPL-MCNC: 43 MG/DL
LDLC SERPL CALC-MCNC: 69 MG/DL
NONHDLC SERPL-MCNC: 79 MG/DL
POTASSIUM SERPL-SCNC: 4.6 MMOL/L
PROT SERPL-MCNC: 6.8 G/DL
SODIUM SERPL-SCNC: 140 MMOL/L
TRIGL SERPL-MCNC: 50 MG/DL

## 2021-11-12 DIAGNOSIS — L98.9 DISORDER OF THE SKIN AND SUBCUTANEOUS TISSUE, UNSPECIFIED: ICD-10-CM

## 2021-11-17 ENCOUNTER — RX RENEWAL (OUTPATIENT)
Age: 76
End: 2021-11-17

## 2021-12-27 LAB — SARS-COV-2 N GENE NPH QL NAA+PROBE: NOT DETECTED

## 2022-01-10 ENCOUNTER — NON-APPOINTMENT (OUTPATIENT)
Age: 77
End: 2022-01-10

## 2022-01-12 ENCOUNTER — TRANSCRIPTION ENCOUNTER (OUTPATIENT)
Age: 77
End: 2022-01-12

## 2022-01-12 LAB — SARS-COV-2 N GENE NPH QL NAA+PROBE: DETECTED

## 2022-01-13 ENCOUNTER — NON-APPOINTMENT (OUTPATIENT)
Age: 77
End: 2022-01-13

## 2022-02-11 ENCOUNTER — APPOINTMENT (OUTPATIENT)
Dept: CARDIOLOGY | Facility: CLINIC | Age: 77
End: 2022-02-11
Payer: MEDICARE

## 2022-02-11 ENCOUNTER — NON-APPOINTMENT (OUTPATIENT)
Age: 77
End: 2022-02-11

## 2022-02-11 VITALS
WEIGHT: 184 LBS | TEMPERATURE: 97.3 F | RESPIRATION RATE: 16 BRPM | BODY MASS INDEX: 24.92 KG/M2 | SYSTOLIC BLOOD PRESSURE: 144 MMHG | OXYGEN SATURATION: 96 % | HEART RATE: 72 BPM | DIASTOLIC BLOOD PRESSURE: 75 MMHG | HEIGHT: 72 IN

## 2022-02-11 PROCEDURE — 99212 OFFICE O/P EST SF 10 MIN: CPT | Mod: 25

## 2022-02-11 PROCEDURE — 93000 ELECTROCARDIOGRAM COMPLETE: CPT

## 2022-02-11 PROCEDURE — 93306 TTE W/DOPPLER COMPLETE: CPT

## 2022-02-13 NOTE — PHYSICAL EXAM
[Well Developed] : well developed [Well Nourished] : well nourished [No Acute Distress] : no acute distress [Normal Venous Pressure] : normal venous pressure [No Carotid Bruit] : no carotid bruit [Normal S1, S2] : normal S1, S2 [No Murmur] : no murmur [No Rub] : no rub [No Gallop] : no gallop [Normal] : clear lung fields, good air entry, no respiratory distress [Clear Lung Fields] : clear lung fields [Good Air Entry] : good air entry [No Respiratory Distress] : no respiratory distress  [Soft] : abdomen soft [Non Tender] : non-tender [No Masses/organomegaly] : no masses/organomegaly [Normal Bowel Sounds] : normal bowel sounds [Normal Gait] : normal gait [No Edema] : no edema [No Cyanosis] : no cyanosis [No Clubbing] : no clubbing [No Varicosities] : no varicosities [No Rash] : no rash [No Skin Lesions] : no skin lesions [Moves all extremities] : moves all extremities [No Focal Deficits] : no focal deficits [Normal Speech] : normal speech [Alert and Oriented] : alert and oriented [Normal memory] : normal memory [Normal Conjunctiva] : the conjunctiva exhibited no abnormalities [Eyelids - No Xanthelasma] : the eyelids demonstrated no xanthelasmas [Normal Oral Mucosa] : normal oral mucosa [No Oral Pallor] : no oral pallor [No Oral Cyanosis] : no oral cyanosis [Normal Jugular Venous A Waves Present] : normal jugular venous A waves present [Normal Jugular Venous V Waves Present] : normal jugular venous V waves present [No Jugular Venous Ruiz A Waves] : no jugular venous ruiz A waves [Respiration, Rhythm And Depth] : normal respiratory rhythm and effort [Exaggerated Use Of Accessory Muscles For Inspiration] : no accessory muscle use [Auscultation Breath Sounds / Voice Sounds] : lungs were clear to auscultation bilaterally [Heart Rate And Rhythm] : heart rate and rhythm were normal [Heart Sounds] : normal S1 and S2 [Murmurs] : no murmurs present [Abdomen Soft] : soft [Abdomen Tenderness] : non-tender [Abdomen Mass (___ Cm)] : no abdominal mass palpated [Abnormal Walk] : normal gait [Gait - Sufficient For Exercise Testing] : the gait was sufficient for exercise testing [Nail Clubbing] : no clubbing of the fingernails [Cyanosis, Localized] : no localized cyanosis [Petechial Hemorrhages (___cm)] : no petechial hemorrhages [Skin Color & Pigmentation] : normal skin color and pigmentation [] : no rash [No Venous Stasis] : no venous stasis [Skin Lesions] : no skin lesions [No Skin Ulcers] : no skin ulcer [No Xanthoma] : no  xanthoma was observed [Oriented To Time, Place, And Person] : oriented to person, place, and time [Affect] : the affect was normal [Mood] : the mood was normal [No Anxiety] : not feeling anxious [de-identified] : cough [FreeTextEntry1] : appears older than stated age

## 2022-02-13 NOTE — ASSESSMENT
[FreeTextEntry1] : medical necessity\par delivers encounter based upon two more chronic stable conditions aortic and resume and hypertension\par \par EKG indicative hypertension
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
## 2022-02-16 ENCOUNTER — TRANSCRIPTION ENCOUNTER (OUTPATIENT)
Age: 77
End: 2022-02-16

## 2022-02-27 ENCOUNTER — EMERGENCY (EMERGENCY)
Facility: HOSPITAL | Age: 77
LOS: 1 days | Discharge: HOME | End: 2022-02-27
Attending: STUDENT IN AN ORGANIZED HEALTH CARE EDUCATION/TRAINING PROGRAM | Admitting: STUDENT IN AN ORGANIZED HEALTH CARE EDUCATION/TRAINING PROGRAM
Payer: MEDICARE

## 2022-02-27 VITALS
SYSTOLIC BLOOD PRESSURE: 133 MMHG | HEIGHT: 72 IN | OXYGEN SATURATION: 95 % | HEART RATE: 95 BPM | WEIGHT: 179.9 LBS | TEMPERATURE: 98 F | RESPIRATION RATE: 17 BRPM | DIASTOLIC BLOOD PRESSURE: 82 MMHG

## 2022-02-27 PROCEDURE — 99285 EMERGENCY DEPT VISIT HI MDM: CPT | Mod: 25

## 2022-02-27 PROCEDURE — 99284 EMERGENCY DEPT VISIT MOD MDM: CPT | Mod: 25

## 2022-02-27 PROCEDURE — 71250 CT THORAX DX C-: CPT | Mod: MA

## 2022-02-27 PROCEDURE — 71250 CT THORAX DX C-: CPT | Mod: 26,MA

## 2022-02-27 PROCEDURE — 23540 CLTX ACROMCLAV DISLC WO MNPJ: CPT

## 2022-02-27 PROCEDURE — 73030 X-RAY EXAM OF SHOULDER: CPT | Mod: 26,RT

## 2022-02-27 PROCEDURE — 70450 CT HEAD/BRAIN W/O DYE: CPT | Mod: 26,MA

## 2022-02-27 PROCEDURE — 70450 CT HEAD/BRAIN W/O DYE: CPT | Mod: MA

## 2022-02-27 PROCEDURE — 73030 X-RAY EXAM OF SHOULDER: CPT

## 2022-02-27 NOTE — ED PROVIDER NOTE - NSICDXPASTMEDICALHX_GEN_ALL_CORE_FT
PAST MEDICAL HISTORY:  Aortic aneurysm, intrathoracic     GERD (gastroesophageal reflux disease)     HTN (hypertension)     Hyperlipemia
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
## 2022-02-27 NOTE — ED PROVIDER NOTE - PHYSICAL EXAMINATION
right shoulder- positive ROM, no point tenderness, positive radial pulse, less than 2 sec cap refill   spine- no bony tenderness, positive ROM   pt ambulating in ED   right anterior chest- + tenderness noted

## 2022-02-27 NOTE — ED ADULT NURSE NOTE - NSIMPLEMENTINTERV_GEN_ALL_ED
Implemented All Universal Safety Interventions:  [place] to call system. Call bell, personal items and telephone within reach. Instruct patient to call for assistance. Room bathroom lighting operational. Non-slip footwear when patient is off stretcher. Physically safe environment: no spills, clutter or unnecessary equipment. Stretcher in lowest position, wheels locked, appropriate side rails in place.

## 2022-02-27 NOTE — ED PROVIDER NOTE - CARE PLAN
Principal Discharge DX:	Closed head injury  Secondary Diagnosis:	Shoulder pain, right  Secondary Diagnosis:	Fall  Secondary Diagnosis:	Contusion of chest   1 Principal Discharge DX:	Separation of AC joint, right, initial encounter  Secondary Diagnosis:	Closed head injury  Secondary Diagnosis:	Shoulder pain, right  Secondary Diagnosis:	Fall  Secondary Diagnosis:	Contusion of chest
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
## 2022-02-27 NOTE — ED PROVIDER NOTE - PATIENT PORTAL LINK FT
You can access the FollowMyHealth Patient Portal offered by [facility] by registering at the following website: [email or web address]. By joining [employer]’s FollowMyHealth portal, you will also be able to view your health information using other applications (apps) compatible with our system.

## 2022-02-27 NOTE — ED PROVIDER NOTE - NSFOLLOWUPINSTRUCTIONS_ED_ALL_ED_FT
Follow up with your pmd within 48 hours- show copies of ER results   Take tylenol 650 mg every 6 hours as needed for pain   Rest, elevate shoulder and use sling for support   Return to the ED if any worsening or persistent symptoms.     Head Injury    WHAT YOU NEED TO KNOW:    A head injury can include your scalp, face, skull, or brain and range from mild to severe. Effects can appear immediately after the injury or develop later. The effects may last a short time or be permanent. Healthcare providers may want to check your recovery over time. Treatment may change as you recover or develop new health problems from the head injury.    DISCHARGE INSTRUCTIONS:    Call your local emergency number (911 in the [place]), or have someone else call if:   •You cannot be woken.      •You have a seizure.      •You stop responding to others or you faint.      •You have blurry or double vision.      •Your speech becomes slurred or confused.      •You have arm or leg weakness, loss of feeling, or new problems with coordination.      •Your pupils are larger than usual, or one pupil is a different size than the other.      •You have blood or clear fluid coming out of your ears or nose.      Return to the emergency department if:   •You have repeated or forceful vomiting.      •You feel confused.      •Your headache gets worse or becomes severe.      •You or someone caring for you notices that you are harder to wake than usual.      Call your doctor if:   •Your symptoms last longer than 6 weeks after the injury.      •You have questions or concerns about your condition or care.      Medicines:   •Acetaminophen decreases pain and fever. It is available without a doctor's order. Ask how much to take and how often to take it. Follow directions. Read the labels of all other medicines you are using to see if they also contain acetaminophen, or ask your doctor or pharmacist. Acetaminophen can cause liver damage if not taken correctly. Do not use more than 4 grams (4,000 milligrams) total of acetaminophen in one day.       •Take your medicine as directed. Contact your healthcare provider if you think your medicine is not helping or if you have side effects. Tell him or her if you are allergic to any medicine. Keep a list of the medicines, vitamins, and herbs you take. Include the amounts, and when and why you take them. Bring the list or the pill bottles to follow-up visits. Carry your medicine list with you in case of an emergency.      Self-care:   •Rest or do quiet activities. Limit your time watching TV, using the computer, or doing tasks that require a lot of thinking. Slowly return to your normal activities as directed. Do not play sports or do activities that may cause you to get hit in the head. Ask your healthcare provider when you can return to sports.      •Apply ice on your head for 15 to 20 minutes every hour or as directed. Use an ice pack, or put crushed ice in a plastic bag. Cover it with a towel before you apply it to your skin. Ice helps prevent tissue damage and decreases swelling and pain.      •Have someone stay with you for 24 hours , or as directed. This person can monitor you for problems and call for help if needed. When you are awake, the person should ask you a few questions every few hours to see if you are thinking clearly. An example is to ask your name or address.      Prevent another head injury:   •Wear a helmet that fits properly. Do this when you play sports, or ride a bike, scooter, or skateboard. Helmets help decrease your risk for a serious head injury. Talk to your healthcare provider about other ways you can protect yourself if you play sports.      •Wear your seatbelt every time you are in a car. This helps lower your risk for a head injury if you are in a car accident.      Follow up with your doctor as directed: Write down your questions so you remember to ask them during your visits.         Shoulder Pain    WHAT YOU NEED TO KNOW:    Shoulder pain is a common problem that can affect your daily activities. Pain can be caused by a problem within your shoulder, such as soreness of a tendon or bursa. A tendon is a cord of tough tissue that connects your muscles to your bones. The bursa is a fluid-filled sac that acts as a cushion between a bone and a tendon. Shoulder pain may also be caused by pain that spreads to your shoulder from another part of your body.    Shoulder Anatomy         DISCHARGE INSTRUCTIONS:    Return to the emergency department if:   •You have severe pain.      •You cannot move your arm or shoulder.      •You have numbness or tingling in your shoulder or arm.      Contact your healthcare provider if:   •Your pain gets worse or does not go away with treatment.      •You have trouble moving your arm or shoulder.      •You have questions or concerns about your condition or care.      Medicines: You may need any of the following:   •Acetaminophen decreases pain and fever. It is available without a doctor's order. Ask how much to take and how often to take it. Follow directions. Read the labels of all other medicines you are using to see if they also contain acetaminophen, or ask your doctor or pharmacist. Acetaminophen can cause liver damage if not taken correctly. Do not use more than 4 grams (4,000 milligrams) total of acetaminophen in one day.       •NSAIDs, such as ibuprofen, help decrease swelling, pain, and fever. This medicine is available with or without a doctor's order. NSAIDs can cause stomach bleeding or kidney problems in certain people. If you take blood thinner medicine, always ask your healthcare provider if NSAIDs are safe for you. Always read the medicine label and follow directions.      •Take your medicine as directed. Contact your healthcare provider if you think your medicine is not helping or if you have side effects. Tell him of her if you are allergic to any medicine. Keep a list of the medicines, vitamins, and herbs you take. Include the amounts, and when and why you take them. Bring the list or the pill bottles to follow-up visits. Carry your medicine list with you in case of an emergency.      Manage your symptoms:   •Apply ice on your shoulder for 20 to 30 minutes every 2 hours or as directed. Use an ice pack, or put crushed ice in a plastic bag. Cover it with a towel before you apply it to your shoulder. Ice helps prevent tissue damage and decreases swelling and pain.      •Apply heat if ice does not help your symptoms. Apply heat on your shoulder for 20 to 30 minutes every 2 hours for as many days as directed. Heat helps decrease pain and muscle spasms.      •Limit activities as directed. Try to avoid repeated overhead movements.      •Go to physical or occupational therapy as directed. A physical therapist teaches you exercises to help improve movement and strength, and to decrease pain. An occupational therapist teaches you skills to help with your daily activities.       Prevent shoulder pain:   •Maintain a good range of motion in your shoulder. Ask your healthcare provider which exercises you should do on a regular basis after you have healed.       •Stretch and strengthen your shoulder. Use proper technique during exercises and sports.      Follow up with your healthcare provider or orthopedist as directed: Write down your questions so you remember to ask them during your visits.      Fall Prevention for Older Adults    WHAT YOU NEED TO KNOW:    As you age, your muscles weaken and your risk for falls increases. Your risk also increases if you take medicines that make you sleepy or dizzy. You may also be at risk if you have vision or joint problems, have low blood pressure, or are not active.    DISCHARGE INSTRUCTIONS:    Call 911 or have someone else call if:   •You have fallen and are unconscious.      •You have fallen and cannot move part of your body.      Contact your healthcare provider if:   •You have fallen and have pain or a headache.      •You have questions or concerns about your condition or care.      Fall prevention tips:   •Stay active. Exercise can help strengthen your muscles and improve your balance. Your healthcare provider may recommend water aerobics, walking, or Shady Chi. He or she may also recommend physical therapy to improve your coordination. Never start an exercise program without asking your healthcare provider first.  Water Aerobics for Seniors       Shady Chi for Seniors           •Wear shoes that fit well and have soles that . Wear shoes both inside and outside. Use slippers with good . Avoid shoes with high heels.      •Use assistive devices as directed. Your healthcare provider may suggest that you use a cane or walker to help you keep your balance. You may need to have grab bars put in your bathroom near the toilet or in the shower.      •Stand or sit up slowly. This may help you keep your balance and prevent falls.      •Wear a personal alarm. This is a device that allows you to call 911 if you need help. Ask for more information on personal alarms.      •Manage your medical conditions.  Keep all appointments with your healthcare providers. Visit your eye doctor as directed.      Home safety tips:     Fall Prevention for Seniors     •Add items to prevent falls in the bathroom. Put nonslip strips on your bath or shower floor to prevent you from slipping. Use a bath mat if you do not have carpet in the bathroom. This will prevent you from falling when you step out of the bath or shower. Use a shower seat so you do not need to stand while you shower. Sit on the toilet or a chair in your bathroom to dry yourself and put on clothing. This will prevent you from losing your balance from drying or dressing yourself while you are standing.      •Keep paths clear. Remove books, shoes, and other objects from walkways and stairs. Place cords for telephones and lamps out of the way so that you do not need to walk over them. Tape them down if you cannot move them. Remove small rugs. If you cannot remove a rug, secure it with double-sided tape. This will prevent you from tripping.      •Install bright lights in your home. Use night lights to help light paths to the bathroom or kitchen. Always turn on the light before you start walking.      •Keep items you use often on shelves within reach. Do not use a step stool to help you reach an item.      •Paint or place reflective tape on the edges of your stairs. This will help you see the stairs better.      Follow up with your healthcare provider as directed: Write down your questions so you remember to ask them during your visits.

## 2022-02-28 ENCOUNTER — NON-APPOINTMENT (OUTPATIENT)
Age: 77
End: 2022-02-28

## 2022-02-28 ENCOUNTER — APPOINTMENT (OUTPATIENT)
Dept: ORTHOPEDIC SURGERY | Facility: CLINIC | Age: 77
End: 2022-02-28
Payer: MEDICARE

## 2022-02-28 VITALS — HEIGHT: 72 IN | WEIGHT: 180 LBS | BODY MASS INDEX: 24.38 KG/M2

## 2022-02-28 PROCEDURE — 99203 OFFICE O/P NEW LOW 30 MIN: CPT

## 2022-02-28 NOTE — PHYSICAL EXAM
[de-identified] : Right Upper Extremity\par o Shoulder :\par ¦ Inspection/Palpation : no tenderness over the greater tuberosity, tenderness to palpation and prominence distal clavicle, no tenderness anterior and posterior glenohumeral joint,no swelling, no deformities\par ¦ Range of Motion : ACTIVE FORWARD ELEVATION: Measured at 120 degrees, ACTIVE EXTERNAL ROTATION: Measured at 45 degrees, ACTIVE INTERNAL ROTATION: Measured at L5\par ¦ Strength : external rotation 5/5, internal rotation 5/5, supraspinatus 5/5\par ¦ Stability : no joint instability on provocative testing\par ¦ Tests/Signs : Neer (-), Garcia (-), cross body adduction (+)\par o Upper Arm : no tenderness, no swelling, no deformities\par o Muscle Bulk : no atrophy\par o Sensation : sensation intact to light touch\par o Skin : no skin rash or discoloration\par o Vascular Exam : no edema, no cyanosis, radial and ulnar pulses normal\par \par Left Upper Extremity\par o Shoulder :\par ¦ Inspection/Palpation : __ tenderness over the greater tuberosity, no acromioclavicular joint tenderness, ___ tenderness anterior and posterior glenohumeral joint,no swelling, no deformities\par ¦ Range of Motion : ACTIVE FORWARD ELEVATION: Measured at *** degrees, ACTIVE EXTERNAL ROTATION: Measured at *** degrees, ACTIVE INTERNAL ROTATION: Measured at ***\par ¦ Strength : external rotation 5/5, internal rotation 5/5, supraspinatus 5/5\par ¦ Stability : no joint instability on provocative testing\par ¦ Tests/Signs : Neer (-), Garcia (-)\par o Upper Arm : no tenderness, no swelling, no deformities\par o Muscle Bulk : no atrophy\par o Sensation : sensation intact to light touch\par o Skin : no skin rash or discoloration\par o Vascular Exam : no edema, no cyanosis, radial and ulnar pulses normal\par \par \par \par   [de-identified] : \par \par \par ACC: [id number] EXAM: XR SHOULDER COMP MIN 2V RT\par \par PROCEDURE DATE: 02/27/2022\par \par \par \par INTERPRETATION: Right shoulder. 3 views. Patient had local injury.\par \par There is moderate degeneration around the glenohumeral joint and there is acromioclavicular arthropathy.\par \par Present film shows an AC separation new since chest film of June 13, 2021.\par \par IMPRESSION: Right AC separation. Baseline degeneration around the shoulder.\par \par --- End of Report ---\par \par \par \par \par \par LACIE MOODY MD; Attending Radiologist\par This document has been electronically signed. Feb 28 2022 9:51AM
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
## 2022-02-28 NOTE — DISCUSSION/SUMMARY
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 He may discontinue use of sling as tolerated. \par \par A home exercise sheet was given and discussed with the patient to follow. \par \par FU 4 weeks \par \par All questions were answered, all alternatives discussed and the patient is in complete agreement with that plan. Follow-up appointment as instructed. Any issues and the patient will call or come in sooner.

## 2022-03-01 NOTE — CHART NOTE - NSCHARTNOTEFT_GEN_A_CORE
BALAJI placed call to patient to discuss and schedule follow up orthopedist appt as requested. Patient presented to ED on 2/27/22 for shoulder pain.  Patient did not answer.  As per [facility], orthopedist follow up was already scheduled and attended on 2/28/22 and has another ortho follow up later this month.

## 2022-03-04 ENCOUNTER — APPOINTMENT (OUTPATIENT)
Dept: FAMILY MEDICINE | Facility: CLINIC | Age: 77
End: 2022-03-04
Payer: MEDICARE

## 2022-03-04 VITALS
DIASTOLIC BLOOD PRESSURE: 78 MMHG | HEART RATE: 78 BPM | OXYGEN SATURATION: 98 % | BODY MASS INDEX: 24.92 KG/M2 | SYSTOLIC BLOOD PRESSURE: 143 MMHG | TEMPERATURE: 96.9 F | RESPIRATION RATE: 16 BRPM | HEIGHT: 72 IN | WEIGHT: 184 LBS

## 2022-03-04 DIAGNOSIS — Z91.81 HISTORY OF FALLING: ICD-10-CM

## 2022-03-04 DIAGNOSIS — S43.101D UNSPECIFIED DISLOCATION OF RIGHT ACROMIOCLAVICULAR JOINT, SUBSEQUENT ENCOUNTER: ICD-10-CM

## 2022-03-04 LAB
BILIRUB UR QL STRIP: NORMAL
CLARITY UR: CLEAR
COLLECTION METHOD: NORMAL
GLUCOSE UR-MCNC: NORMAL
HCG UR QL: 4 EU/DL
HGB UR QL STRIP.AUTO: NORMAL
KETONES UR-MCNC: NORMAL
LEUKOCYTE ESTERASE UR QL STRIP: NORMAL
NITRITE UR QL STRIP: NORMAL
PH UR STRIP: 6
PROT UR STRIP-MCNC: NORMAL
SP GR UR STRIP: 1.02

## 2022-03-04 PROCEDURE — 81003 URINALYSIS AUTO W/O SCOPE: CPT | Mod: QW

## 2022-03-04 PROCEDURE — 99213 OFFICE O/P EST LOW 20 MIN: CPT | Mod: 25

## 2022-03-05 NOTE — REVIEW OF SYSTEMS
[Joint Pain] : joint pain [Joint Stiffness] : joint stiffness [Muscle Pain] : muscle pain [Joint Swelling] : joint swelling [Negative] : Heme/Lymph [FreeTextEntry9] : right shoulder AC dilocation

## 2022-03-05 NOTE — ASSESSMENT
[FreeTextEntry1] : advised cont PT as referral done in system printed for pt\par advised f/u cardiology aorta same 4.4 cm on Chest CT also calcifications d/w pt \par pt's questions answered verbalizing understanding

## 2022-03-05 NOTE — PHYSICAL EXAM
[Regular Rhythm] : with a regular rhythm [Normal S1, S2] : normal S1 and S2 [Normal] : soft, non-tender, non-distended, no masses palpated, no HSM and normal bowel sounds [de-identified] : elevation exernal asect clavicle with sweling and resolving brusing anterior right shoulder with sling inplace good  and strenght decreased ROM sec pain denies numbness or tinggling

## 2022-03-10 ENCOUNTER — APPOINTMENT (OUTPATIENT)
Dept: ORTHOPEDIC SURGERY | Facility: CLINIC | Age: 77
End: 2022-03-10

## 2022-04-03 ENCOUNTER — RX RENEWAL (OUTPATIENT)
Age: 77
End: 2022-04-03

## 2022-04-04 ENCOUNTER — APPOINTMENT (OUTPATIENT)
Dept: ORTHOPEDIC SURGERY | Facility: CLINIC | Age: 77
End: 2022-04-04
Payer: MEDICARE

## 2022-04-04 VITALS — HEIGHT: 72 IN | BODY MASS INDEX: 24.38 KG/M2 | WEIGHT: 180 LBS

## 2022-04-04 DIAGNOSIS — S43.101D UNSPECIFIED DISLOCATION OF RIGHT ACROMIOCLAVICULAR JOINT, SUBSEQUENT ENCOUNTER: ICD-10-CM

## 2022-04-04 PROCEDURE — 99213 OFFICE O/P EST LOW 20 MIN: CPT
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
## 2022-04-04 NOTE — DISCUSSION/SUMMARY
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 Continue progressing with physical therapy.\par \par Patient may follow up if their symptoms worsen in the future. \par \par All questions were answered, all alternatives discussed and the patient is in complete agreement with that plan. Follow-up appointment as instructed. Any issues and the patient will call or come in sooner.

## 2022-04-04 NOTE — ADDENDUM
[FreeTextEntry1] : I, Dax Frey, acted solely as a scribe for Dr. Romeo Javier on this date 04/04/2022.

## 2022-04-04 NOTE — END OF VISIT
[FreeTextEntry3] : All medical record entries made by the Franciscoibe were at my, Dr. Romeo Javier, direction and personally dictated by me on 04/04/2022. I have reviewed the chart and agree that the record accurately reflects my personal performance of the history, physical exam, assessment and plan. I have also personally directed, reviewed, and agreed with the chart.

## 2022-04-04 NOTE — PHYSICAL EXAM
[Normal RUE] : Right Upper Extremity: No scars, rashes, lesions, ulcers, skin intact [Normal LUE] : Left Upper Extremity: No scars, rashes, lesions, ulcers, skin intact [Normal Touch] : sensation intact for touch [Normal] : Alert and in no acute distress [de-identified] : Right Upper Extremity\par o Shoulder :\par ¦ Inspection/Palpation : no tenderness over the greater tuberosity, tenderness to palpation and prominence distal clavicle, no tenderness anterior and posterior glenohumeral joint, no tenderness AC joint, no swelling, no deformities\par ¦ Range of Motion : ACTIVE FORWARD ELEVATION: Measured at 125 degrees, ACTIVE EXTERNAL ROTATION: Measured at 75 degrees, ACTIVE INTERNAL ROTATION: Measured at L3\par ¦ Strength : external rotation 5/5, internal rotation 5/5, supraspinatus 5/5\par ¦ Stability : no joint instability on provocative testing\par ¦ Tests/Signs : Neer (-), Garcia (-), cross body adduction (+)\par o Upper Arm : no tenderness, no swelling, no deformities\par o Muscle Bulk : no atrophy\par o Sensation : sensation intact to light touch\par o Skin : no skin rash or discoloration\par o Vascular Exam : no edema, no cyanosis, radial and ulnar pulses normal [de-identified] : ACC: [id number] EXAM: XR SHOULDER COMP MIN 2V RT\par \par PROCEDURE DATE: 02/27/2022\par \par INTERPRETATION: Right shoulder. 3 views. Patient had local injury.\par \par There is moderate degeneration around the glenohumeral joint and there is acromioclavicular arthropathy.\par \par Present film shows an AC separation new since chest film of June 13, 2021.\par \par IMPRESSION: Right AC separation. Baseline degeneration around the shoulder.\par \par --- End of Report ---\par \par LACIE MOODY MD; Attending Radiologist\par This document has been electronically signed. Feb 28 2022 9:51AM

## 2022-04-06 ENCOUNTER — TRANSCRIPTION ENCOUNTER (OUTPATIENT)
Age: 77
End: 2022-04-06

## 2022-05-19 ENCOUNTER — RX RENEWAL (OUTPATIENT)
Age: 77
End: 2022-05-19

## 2022-08-12 ENCOUNTER — APPOINTMENT (OUTPATIENT)
Dept: CARDIOLOGY | Facility: CLINIC | Age: 77
End: 2022-08-12

## 2022-08-12 ENCOUNTER — NON-APPOINTMENT (OUTPATIENT)
Age: 77
End: 2022-08-12

## 2022-08-12 VITALS
TEMPERATURE: 98.3 F | WEIGHT: 173 LBS | DIASTOLIC BLOOD PRESSURE: 86 MMHG | SYSTOLIC BLOOD PRESSURE: 170 MMHG | RESPIRATION RATE: 16 BRPM | HEART RATE: 64 BPM | OXYGEN SATURATION: 98 % | HEIGHT: 72 IN | BODY MASS INDEX: 23.43 KG/M2

## 2022-08-12 PROCEDURE — 99212 OFFICE O/P EST SF 10 MIN: CPT | Mod: 25

## 2022-08-12 PROCEDURE — 93015 CV STRESS TEST SUPVJ I&R: CPT

## 2022-08-12 PROCEDURE — 93000 ELECTROCARDIOGRAM COMPLETE: CPT | Mod: 59

## 2022-08-16 NOTE — ASSESSMENT
[FreeTextEntry1] : 8/12/22\par no new recommendations\par \par medical necessity\par low risk encounter based upon two more chronic stable conditions aortic ectasia and hypertension\par \par EKG indicative hypertension
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
## 2022-08-16 NOTE — PHYSICAL EXAM
[Well Developed] : well developed [Well Nourished] : well nourished [No Acute Distress] : no acute distress [Normal Venous Pressure] : normal venous pressure [No Carotid Bruit] : no carotid bruit [Normal S1, S2] : normal S1, S2 [No Murmur] : no murmur [No Rub] : no rub [No Gallop] : no gallop [Normal] : clear lung fields, good air entry, no respiratory distress [Clear Lung Fields] : clear lung fields [Good Air Entry] : good air entry [No Respiratory Distress] : no respiratory distress  [Soft] : abdomen soft [Non Tender] : non-tender [No Masses/organomegaly] : no masses/organomegaly [Normal Bowel Sounds] : normal bowel sounds [Normal Gait] : normal gait [No Edema] : no edema [No Cyanosis] : no cyanosis [No Clubbing] : no clubbing [No Varicosities] : no varicosities [No Rash] : no rash [No Skin Lesions] : no skin lesions [Moves all extremities] : moves all extremities [No Focal Deficits] : no focal deficits [Normal Speech] : normal speech [Alert and Oriented] : alert and oriented [Normal memory] : normal memory [Normal Conjunctiva] : the conjunctiva exhibited no abnormalities [Eyelids - No Xanthelasma] : the eyelids demonstrated no xanthelasmas [Normal Oral Mucosa] : normal oral mucosa [No Oral Pallor] : no oral pallor [No Oral Cyanosis] : no oral cyanosis [Normal Jugular Venous A Waves Present] : normal jugular venous A waves present [Normal Jugular Venous V Waves Present] : normal jugular venous V waves present [No Jugular Venous Ruiz A Waves] : no jugular venous ruiz A waves [Respiration, Rhythm And Depth] : normal respiratory rhythm and effort [Exaggerated Use Of Accessory Muscles For Inspiration] : no accessory muscle use [Auscultation Breath Sounds / Voice Sounds] : lungs were clear to auscultation bilaterally [Heart Rate And Rhythm] : heart rate and rhythm were normal [Heart Sounds] : normal S1 and S2 [Murmurs] : no murmurs present [Abdomen Soft] : soft [Abdomen Tenderness] : non-tender [Abdomen Mass (___ Cm)] : no abdominal mass palpated [Abnormal Walk] : normal gait [Gait - Sufficient For Exercise Testing] : the gait was sufficient for exercise testing [Nail Clubbing] : no clubbing of the fingernails [Cyanosis, Localized] : no localized cyanosis [Petechial Hemorrhages (___cm)] : no petechial hemorrhages [Skin Color & Pigmentation] : normal skin color and pigmentation [] : no rash [No Venous Stasis] : no venous stasis [Skin Lesions] : no skin lesions [No Skin Ulcers] : no skin ulcer [No Xanthoma] : no  xanthoma was observed [Oriented To Time, Place, And Person] : oriented to person, place, and time [Affect] : the affect was normal [Mood] : the mood was normal [No Anxiety] : not feeling anxious [de-identified] : cough [FreeTextEntry1] : appears older than stated age

## 2022-10-02 ENCOUNTER — EMERGENCY (EMERGENCY)
Facility: HOSPITAL | Age: 77
LOS: 1 days | Discharge: HOME | End: 2022-10-02
Attending: EMERGENCY MEDICINE | Admitting: EMERGENCY MEDICINE
Payer: MEDICARE

## 2022-10-02 VITALS
DIASTOLIC BLOOD PRESSURE: 84 MMHG | WEIGHT: 164.91 LBS | HEART RATE: 66 BPM | OXYGEN SATURATION: 98 % | SYSTOLIC BLOOD PRESSURE: 155 MMHG | RESPIRATION RATE: 18 BRPM | TEMPERATURE: 99 F | HEIGHT: 72 IN

## 2022-10-02 PROCEDURE — 71101 X-RAY EXAM UNILAT RIBS/CHEST: CPT

## 2022-10-02 PROCEDURE — 99283 EMERGENCY DEPT VISIT LOW MDM: CPT

## 2022-10-02 PROCEDURE — 71101 X-RAY EXAM UNILAT RIBS/CHEST: CPT | Mod: 26

## 2022-10-02 PROCEDURE — 99284 EMERGENCY DEPT VISIT MOD MDM: CPT

## 2022-10-02 NOTE — ED PROVIDER NOTE - PATIENT PORTAL LINK FT
You can access the FollowMyHealth Patient Portal offered by [facility] by registering at the following website: [email or web address]. By joining [employer]’s FollowMyHealth portal, you will also be able to view your health information using other applications (apps) compatible with our system.

## 2022-10-02 NOTE — ED PROVIDER NOTE - PHYSICAL EXAMINATION
Gen: alert, NAD  HEENT:  NC/AT, PERR, no exophthalmos  CV:  well perfused, rrr   Pulm:  normal RR, I/E ratio and chest excursion  Abd: s/nt/nd  MSK: mild R upper chest wall ttp, moving all extremities  Neuro:  non-focal  Skin:  visualized areas intact  Psych: AOx3

## 2022-10-02 NOTE — ED ADULT NURSE NOTE - OBJECTIVE STATEMENT
Patient c/o fall onto his right side with right rib pain. Patient denies chest pain, SOB, headache, dizziness and blurry vision

## 2022-10-02 NOTE — ED PROVIDER NOTE - OBJECTIVE STATEMENT
pt fell, landed with his arm against his R chest and now states that it hurts when he takes a deep breath but very mild and wants to see if he has a rib fx. denies any other injuries. pt declined pain medication.

## 2022-10-02 NOTE — ED ADULT NURSE NOTE - NSIMPLEMENTINTERV_GEN_ALL_ED
Implemented All Fall with Harm Risk Interventions:  [place] to call system. Call bell, personal items and telephone within reach. Instruct patient to call for assistance. Room bathroom lighting operational. Non-slip footwear when patient is off stretcher. Physically safe environment: no spills, clutter or unnecessary equipment. Stretcher in lowest position, wheels locked, appropriate side rails in place. Provide visual cue, wrist band, yellow gown, etc. Monitor gait and stability. Monitor for mental status changes and reorient to person, place, and time. Review medications for side effects contributing to fall risk. Reinforce activity limits and safety measures with patient and family. Provide visual clues: red socks.

## 2022-10-02 NOTE — ED PROVIDER NOTE - NSFOLLOWUPINSTRUCTIONS_ED_ALL_ED_FT
-- Return to the ER for worsening or persistent symptoms, and/or ANY NEW OR CONCERNING SYMPTOMS.    -- If you have difficulty following up, please call: [phone] to obtain a [facility] doctor or specialist who takes your insurance in your area.

## 2022-10-06 ENCOUNTER — NON-APPOINTMENT (OUTPATIENT)
Age: 77
End: 2022-10-06

## 2022-10-08 NOTE — CHART NOTE - NSCHARTNOTEFT_GEN_A_CORE
SW called pt to discuss and assist with follow up care.  Pt is a 76 y/o male presented to ED for rib pain/injury.  As per [facility]IE pt has scheduled primary care appt with Dr. Stacy Ojeda on 10/10/22.  Pt is encouraged to call SW if further assistance is needed.

## 2022-10-10 ENCOUNTER — APPOINTMENT (OUTPATIENT)
Dept: FAMILY MEDICINE | Facility: CLINIC | Age: 77
End: 2022-10-10

## 2022-10-10 VITALS
SYSTOLIC BLOOD PRESSURE: 127 MMHG | OXYGEN SATURATION: 97 % | BODY MASS INDEX: 22.35 KG/M2 | TEMPERATURE: 97.4 F | HEART RATE: 58 BPM | DIASTOLIC BLOOD PRESSURE: 66 MMHG | HEIGHT: 72 IN | WEIGHT: 165 LBS | RESPIRATION RATE: 16 BRPM

## 2022-10-10 DIAGNOSIS — I77.810 THORACIC AORTIC ECTASIA: ICD-10-CM

## 2022-10-10 LAB
ALBUMIN SERPL ELPH-MCNC: 4 G/DL
ALP BLD-CCNC: 64 U/L
ALT SERPL-CCNC: 15 U/L
ANION GAP SERPL CALC-SCNC: 9 MMOL/L
AST SERPL-CCNC: 19 U/L
BILIRUB SERPL-MCNC: 1.2 MG/DL
BUN SERPL-MCNC: 16 MG/DL
CALCIUM SERPL-MCNC: 8.9 MG/DL
CHLORIDE SERPL-SCNC: 106 MMOL/L
CHOLEST SERPL-MCNC: 113 MG/DL
CO2 SERPL-SCNC: 26 MMOL/L
CREAT SERPL-MCNC: 1.03 MG/DL
EGFR: 75 ML/MIN/1.73M2
GLUCOSE SERPL-MCNC: 96 MG/DL
HDLC SERPL-MCNC: 45 MG/DL
LDLC SERPL CALC-MCNC: 58 MG/DL
NONHDLC SERPL-MCNC: 68 MG/DL
POTASSIUM SERPL-SCNC: 4.7 MMOL/L
PROT SERPL-MCNC: 6.4 G/DL
SODIUM SERPL-SCNC: 141 MMOL/L
TRIGL SERPL-MCNC: 51 MG/DL

## 2022-10-10 PROCEDURE — 90662 IIV NO PRSV INCREASED AG IM: CPT

## 2022-10-10 PROCEDURE — G0008: CPT

## 2022-10-10 PROCEDURE — 99397 PER PM REEVAL EST PAT 65+ YR: CPT | Mod: 25

## 2022-10-10 NOTE — HEALTH RISK ASSESSMENT
[Very Good] : ~his/her~ current health as very good [Fair] :  ~his/her~ mood as fair [Intercurrent ED visits] : went to ED [Never] : Never [Yes] : Yes [One fall no injury in past year] : Patient reported one fall in the past year without injury [0] : 2) Feeling down, depressed, or hopeless: Not at all (0) [Patient reported colonoscopy was normal] : Patient reported colonoscopy was normal [HIV test declined] : HIV test declined [Hepatitis C test declined] : Hepatitis C test declined [None] : None [Alone] : lives alone [Employed] : employed [Graduate School] : graduate school [] :  [# Of Children ___] : has [unfilled] children [Feels Safe at Home] : Feels safe at home [Fully functional (bathing, dressing, toileting, transferring, walking, feeding)] : Fully functional (bathing, dressing, toileting, transferring, walking, feeding) [Fully functional (using the telephone, shopping, preparing meals, housekeeping, doing laundry, using] : Fully functional and needs no help or supervision to perform IADLs (using the telephone, shopping, preparing meals, housekeeping, doing laundry, using transportation, managing medications and managing finances) [Smoke Detector] : smoke detector [Carbon Monoxide Detector] : carbon monoxide detector [Safety elements used in home] : safety elements used in home [Seat Belt] :  uses seat belt [I will adhere to the patient's wishes.] : I will adhere to the patient's wishes. [Designated Healthcare Proxy] : Designated healthcare proxy [Name: ___] : Health Care Proxy's Name: [unfilled]  [Relationship: ___] : Relationship: [unfilled] [FreeTextEntry1] : none [de-identified] : fall over garbage can  [de-identified] : very seldom [de-identified] : over garbage can hit chest area right sioded x rays negative  [Change in mental status noted] : No change in mental status noted [Sexually Active] : not sexually active [High Risk Behavior] : no high risk behavior [Reports changes in hearing] : Reports no changes in hearing [Reports changes in vision] : Reports no changes in vision [Reports changes in dental health] : Reports no changes in dental health [Guns at Home] : no guns at home [Sunscreen] : does not use sunscreen [TB Exposure] : is not being exposed to tuberculosis [Caregiver Concerns] : does not have caregiver concerns [ColonoscopyDate] : 3/5/2018 [ColonoscopyComments] : repeat 5 years DR Ramirez [de-identified] : [employer]  services  [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

## 2022-10-10 NOTE — HISTORY OF PRESENT ILLNESS
[FreeTextEntry1] : CPE  f/u HTN HLD BPH CAD Thoracic AA [de-identified] : 78 y/o PMHX HTN BPH HLD PVD AA, GERD,  here for annual exam. Pt notes 10/2/2022 fell over garbage can and ended up in ER and had x-ray all negative dx bruising  pt notes had ribs fractures in past and worried might have the same. no LOC or head trauma. Pt now here pain right shoulder still pain and decreased ROM  pt otherwise no CP SOB palpitations or dizziness

## 2022-10-10 NOTE — REVIEW OF SYSTEMS
[Vision Problems] : vision problems [Joint Pain] : joint pain [Joint Stiffness] : joint stiffness [Muscle Pain] : muscle pain [Muscle Weakness] : muscle weakness [Negative] : Heme/Lymph [Joint Swelling] : no joint swelling [FreeTextEntry3] : wears glasses [de-identified] : cherry angioma [FreeTextEntry9] : bilateral knee pain R>L

## 2022-10-14 ENCOUNTER — RX RENEWAL (OUTPATIENT)
Age: 77
End: 2022-10-14

## 2023-02-14 ENCOUNTER — NON-APPOINTMENT (OUTPATIENT)
Age: 78
End: 2023-02-14

## 2023-02-14 ENCOUNTER — APPOINTMENT (OUTPATIENT)
Dept: CARDIOLOGY | Facility: CLINIC | Age: 78
End: 2023-02-14
Payer: MEDICARE

## 2023-02-14 VITALS
HEIGHT: 72 IN | DIASTOLIC BLOOD PRESSURE: 78 MMHG | OXYGEN SATURATION: 96 % | SYSTOLIC BLOOD PRESSURE: 150 MMHG | WEIGHT: 168 LBS | BODY MASS INDEX: 22.75 KG/M2 | HEART RATE: 70 BPM | RESPIRATION RATE: 16 BRPM

## 2023-02-14 PROCEDURE — 99212 OFFICE O/P EST SF 10 MIN: CPT | Mod: 25

## 2023-02-14 PROCEDURE — 93000 ELECTROCARDIOGRAM COMPLETE: CPT

## 2023-02-14 PROCEDURE — 93306 TTE W/DOPPLER COMPLETE: CPT

## 2023-02-20 NOTE — ASSESSMENT
[FreeTextEntry1] : 8/12/22\par no new recommendations\par \par 2/14/23\par maintain current dosing 110/50 6 months Rx to [facility] aortic dimension stable 4.39\par \par medical necessity\par low risk encounter based upon two more chronic stable conditions aortic ectasia and hypertension\par \par EKG indicative hypertension

## 2023-02-20 NOTE — REASON FOR VISIT
[FreeTextEntry1] : no new complaint for gama doing well \par \par 8/12/22\par no new complaints\par \par 2/14/23\par doing well without cardiac complaints

## 2023-02-20 NOTE — CARDIOLOGY SUMMARY
[Normal] : normal [___] : [unfilled] [No Ischemia] : no Ischemia [___] : [unfilled] [de-identified] : 2/11/22 sinus rhythm PACs low-voltage limb lead [de-identified] : 2/11/22 stable aortic dimension

## 2023-02-20 NOTE — PHYSICAL EXAM
[Well Developed] : well developed [Well Nourished] : well nourished [No Acute Distress] : no acute distress [Normal Venous Pressure] : normal venous pressure [No Carotid Bruit] : no carotid bruit [Normal S1, S2] : normal S1, S2 [No Murmur] : no murmur [No Rub] : no rub [No Gallop] : no gallop [Normal] : clear lung fields, good air entry, no respiratory distress [Clear Lung Fields] : clear lung fields [Good Air Entry] : good air entry [No Respiratory Distress] : no respiratory distress  [Soft] : abdomen soft [Non Tender] : non-tender [No Masses/organomegaly] : no masses/organomegaly [Normal Bowel Sounds] : normal bowel sounds [Normal Gait] : normal gait [No Edema] : no edema [No Cyanosis] : no cyanosis [No Clubbing] : no clubbing [No Varicosities] : no varicosities [No Rash] : no rash [No Skin Lesions] : no skin lesions [Moves all extremities] : moves all extremities [No Focal Deficits] : no focal deficits [Normal Speech] : normal speech [Alert and Oriented] : alert and oriented [Normal memory] : normal memory [Normal Conjunctiva] : the conjunctiva exhibited no abnormalities [Eyelids - No Xanthelasma] : the eyelids demonstrated no xanthelasmas [Normal Oral Mucosa] : normal oral mucosa [No Oral Pallor] : no oral pallor [No Oral Cyanosis] : no oral cyanosis [Normal Jugular Venous A Waves Present] : normal jugular venous A waves present [Normal Jugular Venous V Waves Present] : normal jugular venous V waves present [No Jugular Venous Ruiz A Waves] : no jugular venous ruiz A waves [Respiration, Rhythm And Depth] : normal respiratory rhythm and effort [Exaggerated Use Of Accessory Muscles For Inspiration] : no accessory muscle use [Auscultation Breath Sounds / Voice Sounds] : lungs were clear to auscultation bilaterally [Heart Rate And Rhythm] : heart rate and rhythm were normal [Heart Sounds] : normal S1 and S2 [Murmurs] : no murmurs present [Abdomen Soft] : soft [Abdomen Tenderness] : non-tender [Abdomen Mass (___ Cm)] : no abdominal mass palpated [Abnormal Walk] : normal gait [Gait - Sufficient For Exercise Testing] : the gait was sufficient for exercise testing [Nail Clubbing] : no clubbing of the fingernails [Cyanosis, Localized] : no localized cyanosis [Petechial Hemorrhages (___cm)] : no petechial hemorrhages [Skin Color & Pigmentation] : normal skin color and pigmentation [] : no rash [No Venous Stasis] : no venous stasis [Skin Lesions] : no skin lesions [No Skin Ulcers] : no skin ulcer [No Xanthoma] : no  xanthoma was observed [Oriented To Time, Place, And Person] : oriented to person, place, and time [Affect] : the affect was normal [Mood] : the mood was normal [No Anxiety] : not feeling anxious [de-identified] : cough [FreeTextEntry1] : appears older than stated age

## 2023-04-19 ENCOUNTER — RX RENEWAL (OUTPATIENT)
Age: 78
End: 2023-04-19

## 2023-05-02 ENCOUNTER — APPOINTMENT (OUTPATIENT)
Dept: FAMILY MEDICINE | Facility: CLINIC | Age: 78
End: 2023-05-02
Payer: MEDICARE

## 2023-05-02 VITALS
SYSTOLIC BLOOD PRESSURE: 133 MMHG | OXYGEN SATURATION: 98 % | BODY MASS INDEX: 22.62 KG/M2 | TEMPERATURE: 98.6 F | HEIGHT: 72 IN | RESPIRATION RATE: 16 BRPM | WEIGHT: 167 LBS | HEART RATE: 88 BPM | DIASTOLIC BLOOD PRESSURE: 71 MMHG

## 2023-05-02 DIAGNOSIS — Z20.828 CONTACT WITH AND (SUSPECTED) EXPOSURE TO OTHER VIRAL COMMUNICABLE DISEASES: ICD-10-CM

## 2023-05-02 DIAGNOSIS — Z92.29 PERSONAL HISTORY OF OTHER DRUG THERAPY: ICD-10-CM

## 2023-05-02 DIAGNOSIS — Z11.59 ENCOUNTER FOR SCREENING FOR OTHER VIRAL DISEASES: ICD-10-CM

## 2023-05-02 DIAGNOSIS — Z09 ENCOUNTER FOR FOLLOW-UP EXAMINATION AFTER COMPLETED TREATMENT FOR CONDITIONS OTHER THAN MALIGNANT NEOPLASM: ICD-10-CM

## 2023-05-02 PROCEDURE — 99213 OFFICE O/P EST LOW 20 MIN: CPT

## 2023-05-02 NOTE — PHYSICAL EXAM
[Soft] : abdomen soft [Non Tender] : non-tender [No Joint Swelling] : no joint swelling [Grossly Normal Strength/Tone] : grossly normal strength/tone [Normal] : no rash

## 2023-05-02 NOTE — HISTORY OF PRESENT ILLNESS
[FreeTextEntry1] : f/u  [de-identified] : 79 y/o HTN HLD paresthesia here for f/u medical conditions.  Notes is doing well but hands at times does get numbness and has to shake them to release it. pt notes is more when goes on his bike. Pt notes still biking a lot  Pt denies bowel or bladder issues feeling fine otherwise no CP SOB palpitation or dizziness

## 2023-05-02 NOTE — ASSESSMENT
[FreeTextEntry1] : cock up splint d/w pt \par labs d/.w pt as well\par no changes on meds\par f/u CPE 10/2023

## 2023-05-03 ENCOUNTER — TRANSCRIPTION ENCOUNTER (OUTPATIENT)
Age: 78
End: 2023-05-03

## 2023-05-03 LAB
ALBUMIN SERPL ELPH-MCNC: 4 G/DL
ALP BLD-CCNC: 54 U/L
ALT SERPL-CCNC: 14 U/L
ANION GAP SERPL CALC-SCNC: 10 MMOL/L
AST SERPL-CCNC: 23 U/L
BILIRUB SERPL-MCNC: 1 MG/DL
BUN SERPL-MCNC: 16 MG/DL
CALCIUM SERPL-MCNC: 9 MG/DL
CHLORIDE SERPL-SCNC: 105 MMOL/L
CHOLEST SERPL-MCNC: 110 MG/DL
CO2 SERPL-SCNC: 25 MMOL/L
CREAT SERPL-MCNC: 1.11 MG/DL
EGFR: 68 ML/MIN/1.73M2
FOLATE SERPL-MCNC: 12.4 NG/ML
GLUCOSE SERPL-MCNC: 92 MG/DL
HDLC SERPL-MCNC: 49 MG/DL
LDLC SERPL CALC-MCNC: 53 MG/DL
NONHDLC SERPL-MCNC: 61 MG/DL
POTASSIUM SERPL-SCNC: 4.6 MMOL/L
PROT SERPL-MCNC: 6.3 G/DL
SODIUM SERPL-SCNC: 140 MMOL/L
TRIGL SERPL-MCNC: 40 MG/DL
VIT B12 SERPL-MCNC: 359 PG/ML

## 2023-08-15 ENCOUNTER — APPOINTMENT (OUTPATIENT)
Dept: CARDIOLOGY | Facility: CLINIC | Age: 78
End: 2023-08-15
Payer: MEDICARE

## 2023-08-15 VITALS
WEIGHT: 164 LBS | HEIGHT: 72 IN | HEART RATE: 77 BPM | OXYGEN SATURATION: 98 % | DIASTOLIC BLOOD PRESSURE: 76 MMHG | BODY MASS INDEX: 22.21 KG/M2 | SYSTOLIC BLOOD PRESSURE: 148 MMHG

## 2023-08-15 PROCEDURE — 93000 ELECTROCARDIOGRAM COMPLETE: CPT

## 2023-08-15 PROCEDURE — 99212 OFFICE O/P EST SF 10 MIN: CPT | Mod: 25

## 2023-08-20 NOTE — PHYSICAL EXAM
[Well Developed] : well developed [Well Nourished] : well nourished [No Acute Distress] : no acute distress [Normal Venous Pressure] : normal venous pressure [No Carotid Bruit] : no carotid bruit [Normal S1, S2] : normal S1, S2 [No Murmur] : no murmur [No Rub] : no rub [No Gallop] : no gallop [Normal] : clear lung fields, good air entry, no respiratory distress [Clear Lung Fields] : clear lung fields [Good Air Entry] : good air entry [No Respiratory Distress] : no respiratory distress  [Soft] : abdomen soft [Non Tender] : non-tender [No Masses/organomegaly] : no masses/organomegaly [Normal Bowel Sounds] : normal bowel sounds [Normal Gait] : normal gait [No Edema] : no edema [No Cyanosis] : no cyanosis [No Clubbing] : no clubbing [No Rash] : no rash [No Varicosities] : no varicosities [No Skin Lesions] : no skin lesions [Moves all extremities] : moves all extremities [No Focal Deficits] : no focal deficits [Normal Speech] : normal speech [Alert and Oriented] : alert and oriented [Normal memory] : normal memory [de-identified] : cough [FreeTextEntry1] : appears older than stated age [Normal Conjunctiva] : the conjunctiva exhibited no abnormalities [Eyelids - No Xanthelasma] : the eyelids demonstrated no xanthelasmas [Normal Oral Mucosa] : normal oral mucosa [No Oral Pallor] : no oral pallor [No Oral Cyanosis] : no oral cyanosis [Normal Jugular Venous A Waves Present] : normal jugular venous A waves present [Normal Jugular Venous V Waves Present] : normal jugular venous V waves present [No Jugular Venous Ruiz A Waves] : no jugular venous ruiz A waves [Respiration, Rhythm And Depth] : normal respiratory rhythm and effort [Exaggerated Use Of Accessory Muscles For Inspiration] : no accessory muscle use [Auscultation Breath Sounds / Voice Sounds] : lungs were clear to auscultation bilaterally [Heart Rate And Rhythm] : heart rate and rhythm were normal [Heart Sounds] : normal S1 and S2 [Murmurs] : no murmurs present [Abdomen Soft] : soft [Abdomen Tenderness] : non-tender [Abdomen Mass (___ Cm)] : no abdominal mass palpated [Abnormal Walk] : normal gait [Gait - Sufficient For Exercise Testing] : the gait was sufficient for exercise testing [Cyanosis, Localized] : no localized cyanosis [Nail Clubbing] : no clubbing of the fingernails [Petechial Hemorrhages (___cm)] : no petechial hemorrhages [Skin Color & Pigmentation] : normal skin color and pigmentation [] : no rash [No Venous Stasis] : no venous stasis [Skin Lesions] : no skin lesions [No Skin Ulcers] : no skin ulcer [No Xanthoma] : no  xanthoma was observed [Oriented To Time, Place, And Person] : oriented to person, place, and time [Affect] : the affect was normal [Mood] : the mood was normal [No Anxiety] : not feeling anxious

## 2023-08-20 NOTE — CARDIOLOGY SUMMARY
[de-identified] : 2/11/22 sinus rhythm PACs low-voltage limb lead [de-identified] : 2/11/22 stable aortic dimension [Normal] : normal [___] : [unfilled] [No Ischemia] : no Ischemia

## 2023-08-20 NOTE — REASON FOR VISIT
[FreeTextEntry1] : no new complaint for gama doing well   8/12/22 no new complaints  2/14/23 doing well without cardiac complaints  8/15/23 doing well without cardiac complaints would continue current course

## 2023-08-20 NOTE — ASSESSMENT
[FreeTextEntry1] : 8/12/22 no new recommendations  2/14/23 maintain current dosing 110/50 6 months Rx to [facility] aortic dimension stable 4.39  8/15/23 follow-up for a borderline aortic aneurysm antihypertensives good control  medical necessity low risk encounter based upon two more chronic stable conditions aortic ectasia and hypertension  EKG indicative hypertension

## 2023-10-11 ENCOUNTER — RX RENEWAL (OUTPATIENT)
Age: 78
End: 2023-10-11

## 2023-10-18 ENCOUNTER — APPOINTMENT (OUTPATIENT)
Dept: FAMILY MEDICINE | Facility: CLINIC | Age: 78
End: 2023-10-18

## 2023-12-06 ENCOUNTER — APPOINTMENT (OUTPATIENT)
Dept: FAMILY MEDICINE | Facility: CLINIC | Age: 78
End: 2023-12-06
Payer: MEDICARE

## 2023-12-06 VITALS
BODY MASS INDEX: 22.21 KG/M2 | HEIGHT: 72 IN | RESPIRATION RATE: 16 BRPM | HEART RATE: 70 BPM | WEIGHT: 164 LBS | DIASTOLIC BLOOD PRESSURE: 70 MMHG | SYSTOLIC BLOOD PRESSURE: 161 MMHG | OXYGEN SATURATION: 97 % | TEMPERATURE: 97.8 F

## 2023-12-06 VITALS — DIASTOLIC BLOOD PRESSURE: 71 MMHG | SYSTOLIC BLOOD PRESSURE: 137 MMHG

## 2023-12-06 DIAGNOSIS — E78.5 HYPERLIPIDEMIA, UNSPECIFIED: ICD-10-CM

## 2023-12-06 DIAGNOSIS — Z00.00 ENCOUNTER FOR GENERAL ADULT MEDICAL EXAMINATION W/OUT ABNORMAL FINDINGS: ICD-10-CM

## 2023-12-06 DIAGNOSIS — M17.11 UNILATERAL PRIMARY OSTEOARTHRITIS, RIGHT KNEE: ICD-10-CM

## 2023-12-06 DIAGNOSIS — Z23 ENCOUNTER FOR IMMUNIZATION: ICD-10-CM

## 2023-12-06 PROCEDURE — G0008: CPT

## 2023-12-06 PROCEDURE — G0439: CPT

## 2023-12-06 PROCEDURE — 90662 IIV NO PRSV INCREASED AG IM: CPT

## 2023-12-08 LAB
ALBUMIN SERPL ELPH-MCNC: 4.2 G/DL
ALP BLD-CCNC: 61 U/L
ALT SERPL-CCNC: 17 U/L
ANION GAP SERPL CALC-SCNC: 8 MMOL/L
AST SERPL-CCNC: 21 U/L
BILIRUB SERPL-MCNC: 1 MG/DL
BUN SERPL-MCNC: 17 MG/DL
CALCIUM SERPL-MCNC: 9.2 MG/DL
CHLORIDE SERPL-SCNC: 104 MMOL/L
CHOLEST SERPL-MCNC: 128 MG/DL
CO2 SERPL-SCNC: 26 MMOL/L
CREAT SERPL-MCNC: 1.1 MG/DL
EGFR: 69 ML/MIN/1.73M2
GLUCOSE SERPL-MCNC: 99 MG/DL
HDLC SERPL-MCNC: 51 MG/DL
LDLC SERPL CALC-MCNC: 67 MG/DL
NONHDLC SERPL-MCNC: 77 MG/DL
POTASSIUM SERPL-SCNC: 4.8 MMOL/L
PROT SERPL-MCNC: 6.8 G/DL
SODIUM SERPL-SCNC: 138 MMOL/L
TRIGL SERPL-MCNC: 40 MG/DL

## 2023-12-31 PROBLEM — Z23 NEED FOR 23-POLYVALENT PNEUMOCOCCAL POLYSACCHARIDE VACCINE: Status: RESOLVED | Noted: 2017-03-07 | Resolved: 2023-12-31

## 2024-01-06 ENCOUNTER — RX RENEWAL (OUTPATIENT)
Age: 79
End: 2024-01-06

## 2024-02-14 ENCOUNTER — NON-APPOINTMENT (OUTPATIENT)
Age: 79
End: 2024-02-14

## 2024-02-14 ENCOUNTER — APPOINTMENT (OUTPATIENT)
Dept: CARDIOLOGY | Facility: CLINIC | Age: 79
End: 2024-02-14
Payer: MEDICARE

## 2024-02-14 VITALS
HEIGHT: 72 IN | OXYGEN SATURATION: 99 % | BODY MASS INDEX: 22.89 KG/M2 | SYSTOLIC BLOOD PRESSURE: 161 MMHG | DIASTOLIC BLOOD PRESSURE: 85 MMHG | WEIGHT: 169 LBS | HEART RATE: 69 BPM

## 2024-02-14 DIAGNOSIS — I10 ESSENTIAL (PRIMARY) HYPERTENSION: ICD-10-CM

## 2024-02-14 DIAGNOSIS — I71.20 THORACIC AORTIC ANEURYSM, WITHOUT RUPTURE, UNSPECIFIED: ICD-10-CM

## 2024-02-14 PROCEDURE — 93306 TTE W/DOPPLER COMPLETE: CPT

## 2024-02-14 PROCEDURE — 93000 ELECTROCARDIOGRAM COMPLETE: CPT

## 2024-02-14 PROCEDURE — 99212 OFFICE O/P EST SF 10 MIN: CPT | Mod: 25

## 2024-02-22 PROBLEM — I71.20 ANEURYSM, THORACIC AORTIC: Status: ACTIVE | Noted: 2017-11-29

## 2024-02-22 NOTE — PHYSICAL EXAM
[Well Developed] : well developed [Well Nourished] : well nourished [No Acute Distress] : no acute distress [Normal Venous Pressure] : normal venous pressure [No Murmur] : no murmur [Normal S1, S2] : normal S1, S2 [No Carotid Bruit] : no carotid bruit [No Gallop] : no gallop [No Rub] : no rub [Normal] : clear lung fields, good air entry, no respiratory distress [Good Air Entry] : good air entry [Clear Lung Fields] : clear lung fields [Soft] : abdomen soft [Non Tender] : non-tender [No Respiratory Distress] : no respiratory distress  [No Masses/organomegaly] : no masses/organomegaly [Normal Bowel Sounds] : normal bowel sounds [No Edema] : no edema [No Cyanosis] : no cyanosis [Normal Gait] : normal gait [No Rash] : no rash [No Clubbing] : no clubbing [No Varicosities] : no varicosities [No Skin Lesions] : no skin lesions [Moves all extremities] : moves all extremities [Normal Speech] : normal speech [Alert and Oriented] : alert and oriented [No Focal Deficits] : no focal deficits [de-identified] : cough [FreeTextEntry1] : appears older than stated age [Normal memory] : normal memory [Eyelids - No Xanthelasma] : the eyelids demonstrated no xanthelasmas [Normal Conjunctiva] : the conjunctiva exhibited no abnormalities [Normal Oral Mucosa] : normal oral mucosa [No Oral Cyanosis] : no oral cyanosis [No Oral Pallor] : no oral pallor [Normal Jugular Venous V Waves Present] : normal jugular venous V waves present [Normal Jugular Venous A Waves Present] : normal jugular venous A waves present [No Jugular Venous Ruiz A Waves] : no jugular venous ruiz A waves [Respiration, Rhythm And Depth] : normal respiratory rhythm and effort [Exaggerated Use Of Accessory Muscles For Inspiration] : no accessory muscle use [Heart Rate And Rhythm] : heart rate and rhythm were normal [Auscultation Breath Sounds / Voice Sounds] : lungs were clear to auscultation bilaterally [Abdomen Soft] : soft [Heart Sounds] : normal S1 and S2 [Murmurs] : no murmurs present [Abdomen Tenderness] : non-tender [Abdomen Mass (___ Cm)] : no abdominal mass palpated [Nail Clubbing] : no clubbing of the fingernails [Abnormal Walk] : normal gait [Gait - Sufficient For Exercise Testing] : the gait was sufficient for exercise testing [Cyanosis, Localized] : no localized cyanosis [Petechial Hemorrhages (___cm)] : no petechial hemorrhages [Skin Color & Pigmentation] : normal skin color and pigmentation [] : no rash [Skin Lesions] : no skin lesions [No Venous Stasis] : no venous stasis [No Skin Ulcers] : no skin ulcer [Oriented To Time, Place, And Person] : oriented to person, place, and time [No Xanthoma] : no  xanthoma was observed [Affect] : the affect was normal [No Anxiety] : not feeling anxious [Mood] : the mood was normal

## 2024-02-22 NOTE — CARDIOLOGY SUMMARY
[de-identified] : 2/11/22 sinus rhythm PACs low-voltage limb lead [de-identified] : 2/11/22 stable aortic dimension [Normal] : normal [___] : [unfilled] [No Ischemia] : no Ischemia [___] : [unfilled]

## 2024-02-22 NOTE — ASSESSMENT
[FreeTextEntry1] : 8/12/22 no new recommendations  2/14/23 maintain current dosing 110/50 6 months Rx to [facility] aortic dimension stable 4.39  8/15/23 follow-up for a borderline aortic aneurysm antihypertensives good control  2/14/2024 No new cardiac recommendations  medical necessity low risk encounter based upon two more chronic stable conditions aortic ectasia and hypertension  EKG indicative hypertension

## 2024-02-22 NOTE — REASON FOR VISIT
[FreeTextEntry1] : no new complaint for gama doing well   8/12/22 no new complaints  2/14/23 doing well without cardiac complaints  8/15/23 doing well without cardiac complaints would continue current course  2/14/2024 No new cardiac recommendations no current signs or symptoms echo satisfactory

## 2024-04-01 ENCOUNTER — RX RENEWAL (OUTPATIENT)
Age: 79
End: 2024-04-01

## 2024-04-01 RX ORDER — ATORVASTATIN CALCIUM 10 MG/1
10 TABLET, FILM COATED ORAL
Qty: 90 | Refills: 1 | Status: ACTIVE | COMMUNITY
Start: 2017-05-05 | End: 1900-01-01

## 2024-04-15 ENCOUNTER — NON-APPOINTMENT (OUTPATIENT)
Age: 79
End: 2024-04-15

## 2024-04-19 ENCOUNTER — EMERGENCY (EMERGENCY)
Facility: HOSPITAL | Age: 79
LOS: 1 days | Discharge: HOME | End: 2024-04-19
Attending: EMERGENCY MEDICINE | Admitting: EMERGENCY MEDICINE
Payer: MEDICARE

## 2024-04-19 VITALS
HEIGHT: 72 IN | SYSTOLIC BLOOD PRESSURE: 162 MMHG | OXYGEN SATURATION: 97 % | DIASTOLIC BLOOD PRESSURE: 78 MMHG | RESPIRATION RATE: 18 BRPM | TEMPERATURE: 98 F | WEIGHT: 167.99 LBS | HEART RATE: 65 BPM

## 2024-04-19 PROCEDURE — G0463: CPT

## 2024-04-19 PROCEDURE — 73080 X-RAY EXAM OF ELBOW: CPT

## 2024-04-19 PROCEDURE — 99284 EMERGENCY DEPT VISIT MOD MDM: CPT

## 2024-04-19 PROCEDURE — 73080 X-RAY EXAM OF ELBOW: CPT | Mod: 26,RT

## 2024-04-19 PROCEDURE — 99283 EMERGENCY DEPT VISIT LOW MDM: CPT

## 2024-04-19 RX ORDER — CEPHALEXIN 500 MG
1 CAPSULE ORAL
Qty: 20 | Refills: 0
Start: 2024-04-19 | End: 2024-04-23

## 2024-04-19 NOTE — ED PROVIDER NOTE - PATIENT PORTAL LINK FT
You can access the FollowMyHealth Patient Portal offered by [facility] by registering at the following website: [email or web address]. By joining [employer]’s FollowMyHealth portal, you will also be able to view your health information using other applications (apps) compatible with our system. Statement Selected

## 2024-04-19 NOTE — ED PROVIDER NOTE - NSFOLLOWUPINSTRUCTIONS_ED_ALL_ED_FT
Follow-up with your primary care physician or orthopedic surgeon in the next 48 hours.  Take all antibiotics until they have gone.

## 2024-04-19 NOTE — ED PROVIDER NOTE - OBJECTIVE STATEMENT
79-year-old male no significant past medical history presenting with drainage from his right elbow.  Patient states he was sitting watching TV when he felt a cold wetness on his arm.  When he looked down he noted a clear yellowish discharge from his right elbow.  He denies any trauma or previous swelling to the area.  There has been no fevers.  Has not had this problem in the past.

## 2024-04-19 NOTE — ED PROVIDER NOTE - PHYSICAL EXAMINATION
Vitals: I have reviewed the patients vital signs  General: nontoxic appearing  HEENT: Atraumatic, normocephalic, airway patent  Eyes: EOMI, tracking appropriately  Neck: no tracheal deviation  Chest/Lungs: no trauma, symmetric chest rise, speaking in complete sentences,  no resp distress  Heart: skin and extremities well perfused, regular rate and rhythm  Neuro: A+Ox3, appears non focal  MSK: no deformities there is a small puncture type wound in the right elbow region over the olecranon.  There is a slow drip of a clear yellow fluid.  It is nontender to examination.  There is a full range of motion with good distal PMS.  There appears to be a small swelling around the area that is draining.  Skin: no cyanosis, no jaundice   Psych:  Normal mood and affect

## 2024-04-19 NOTE — ED PROVIDER NOTE - CLINICAL SUMMARY MEDICAL DECISION MAKING FREE TEXT BOX
Patient with this drainage from his elbow.  It does appear to look like a bursitis type of fluid.  It is nontender however which is somewhat atypical.  I will get an x-ray to look for any type of abnormality but do not expect much.  Being that this is drainage going to put on a pressure dressing and give antibiotics to prevent any type of infection.  Patient to follow-up with his PMD orthopedic surgery

## 2024-04-20 VITALS
SYSTOLIC BLOOD PRESSURE: 158 MMHG | DIASTOLIC BLOOD PRESSURE: 70 MMHG | HEART RATE: 61 BPM | RESPIRATION RATE: 18 BRPM | OXYGEN SATURATION: 98 %

## 2024-04-20 NOTE — ED ADULT NURSE NOTE - NSFALLUNIVINTERV_ED_ALL_ED
Bed/Stretcher in lowest position, wheels locked, appropriate side rails in place/Call bell, personal items and telephone in reach/Instruct patient to call for assistance before getting out of bed/chair/stretcher/Non-slip footwear applied when patient is off stretcher/[place] to call system/Physically safe environment - no spills, clutter or unnecessary equipment/Purposeful proactive rounding/Room/bathroom lighting operational, light cord in reach

## 2024-04-27 NOTE — CHART NOTE - NSCHARTNOTEFT_GEN_A_CORE
SW called pt to discuss and assist with follow care.  Pt is a 78 y/o male presented to ED for wound check.  Pt reports that pt is feeling better and had an appt with primary Stacy Leavitt on 4/26/24 and advised to wait for a week before further interventions,

## 2024-05-03 ENCOUNTER — NON-APPOINTMENT (OUTPATIENT)
Age: 79
End: 2024-05-03

## 2024-05-13 ENCOUNTER — APPOINTMENT (OUTPATIENT)
Dept: ORTHOPEDIC SURGERY | Facility: CLINIC | Age: 79
End: 2024-05-13
Payer: MEDICARE

## 2024-05-13 VITALS — HEIGHT: 72 IN | WEIGHT: 165 LBS | BODY MASS INDEX: 22.35 KG/M2

## 2024-05-13 DIAGNOSIS — M70.21 OLECRANON BURSITIS, RIGHT ELBOW: ICD-10-CM

## 2024-05-13 PROCEDURE — 20605 DRAIN/INJ JOINT/BURSA W/O US: CPT | Mod: RT

## 2024-05-13 PROCEDURE — 99214 OFFICE O/P EST MOD 30 MIN: CPT | Mod: 25

## 2024-05-13 RX ORDER — CEPHALEXIN 500 MG/1
500 CAPSULE ORAL
Qty: 14 | Refills: 0 | Status: ACTIVE | COMMUNITY
Start: 2024-05-13 | End: 1900-01-01

## 2024-05-23 NOTE — ED ADULT NURSE NOTE - NS ED NOTE  TALK SOMEONE YN
Mr. Norris is a 64 year-old man with hypertension, aortic atherosclerosis observed on prior CT scan, prior DVT treated with coumadin, microscopic hematuria, and IBS. He presented recently to Dr. Ott for annual follow-up. He was observed to be in atrial fibrillation. Eliquis was started. He has no obvious symptoms. He underwent TTE showing reduced LVEF of 30-35%. He presents today for NUVIA and DCCV.     TTE 4/30/2024    Left Ventricle: The left ventricle is mildly dilated. There is concentric hypertrophy. Global hypokinesis present. There is moderately reduced systolic function with a visually estimated ejection fraction of 30 - 35%. Biplane (2D) method of discs ejection fraction is 32%. Unable to assess diastolic function due to atrial fibrillation.    Right Ventricle: Normal right ventricular cavity size. Systolic function is reduced.TAPSE is 1.23 cm.    Left Atrium: Left atrium is severely dilated. The left atrium volume index is 56.2 mL/m2. Patent foramen ovale visualized.    Right Atrium: Right atrium is moderate to severe dilated.    Mitral Valve: There is mild to moderate regurgitation.    Pulmonary Artery: The estimated pulmonary artery systolic pressure is 34 mmHg.    IVC/SVC: Intermediate venous pressure at 8 mmHg.    Aortic root is measuring 3.69 cm. Ascending aorta is measuring 3.85 cm.    Anticoagulant/antiplatelets: Eliquis 5 mg bid   ECG: Atrial fibrillation    Dysphagia or odynophagia:  No  Liver Disease, esophageal disease, or known varices:  No  Upper GI Bleeding: No  Snoring:  Yes  Sleep Apnea:   Not diagnosed  Prior neck surgery or radiation:  No  Able to move neck in all directions:  Yes  History of anesthetic difficulties:  No  Family history of anesthetic difficulties:  No  Last oral intake: yesterday before midnight  GLP-1 use: No    Platelet count: 248k  INR: 1.1     No

## 2024-05-26 ENCOUNTER — EMERGENCY (EMERGENCY)
Facility: HOSPITAL | Age: 79
LOS: 1 days | Discharge: HOME | End: 2024-05-26
Attending: INTERNAL MEDICINE | Admitting: INTERNAL MEDICINE
Payer: MEDICARE

## 2024-05-26 VITALS
HEIGHT: 72 IN | RESPIRATION RATE: 18 BRPM | SYSTOLIC BLOOD PRESSURE: 137 MMHG | DIASTOLIC BLOOD PRESSURE: 69 MMHG | HEART RATE: 87 BPM | WEIGHT: 162.04 LBS | TEMPERATURE: 99 F | OXYGEN SATURATION: 96 %

## 2024-05-26 LAB
APPEARANCE UR: CLEAR — SIGNIFICANT CHANGE UP
BACTERIA # UR AUTO: ABNORMAL /HPF
BILIRUB UR-MCNC: NEGATIVE — SIGNIFICANT CHANGE UP
COLOR SPEC: YELLOW — SIGNIFICANT CHANGE UP
COMMENT - URINE: SIGNIFICANT CHANGE UP
DIFF PNL FLD: NEGATIVE — SIGNIFICANT CHANGE UP
EPI CELLS # UR: 2 — SIGNIFICANT CHANGE UP
GLUCOSE UR QL: NEGATIVE MG/DL — SIGNIFICANT CHANGE UP
KETONES UR-MCNC: 15 MG/DL
LEUKOCYTE ESTERASE UR-ACNC: ABNORMAL
NITRITE UR-MCNC: NEGATIVE — SIGNIFICANT CHANGE UP
PH UR: 5 — SIGNIFICANT CHANGE UP (ref 5–8)
PROT UR-MCNC: SIGNIFICANT CHANGE UP MG/DL
RBC CASTS # UR COMP ASSIST: 0 /HPF — SIGNIFICANT CHANGE UP (ref 0–4)
SP GR SPEC: 1.02 — SIGNIFICANT CHANGE UP (ref 1–1.03)
UROBILINOGEN FLD QL: 1 MG/DL — SIGNIFICANT CHANGE UP (ref 0.2–1)
WBC UR QL: 50 /HPF — HIGH (ref 0–5)

## 2024-05-26 PROCEDURE — 81001 URINALYSIS AUTO W/SCOPE: CPT

## 2024-05-26 PROCEDURE — 87186 SC STD MICRODIL/AGAR DIL: CPT

## 2024-05-26 PROCEDURE — 99284 EMERGENCY DEPT VISIT MOD MDM: CPT

## 2024-05-26 PROCEDURE — 87077 CULTURE AEROBIC IDENTIFY: CPT

## 2024-05-26 PROCEDURE — 87086 URINE CULTURE/COLONY COUNT: CPT

## 2024-05-26 PROCEDURE — 99283 EMERGENCY DEPT VISIT LOW MDM: CPT

## 2024-05-26 RX ORDER — CEFPODOXIME PROXETIL 100 MG
1 TABLET ORAL
Qty: 14 | Refills: 0
Start: 2024-05-26 | End: 2024-06-01

## 2024-05-26 RX ORDER — CEFUROXIME AXETIL 250 MG
500 TABLET ORAL ONCE
Refills: 0 | Status: COMPLETED | OUTPATIENT
Start: 2024-05-26 | End: 2024-05-26

## 2024-05-26 RX ORDER — PHENAZOPYRIDINE HCL 100 MG
1 TABLET ORAL
Qty: 4 | Refills: 0
Start: 2024-05-26 | End: 2024-05-27

## 2024-05-26 RX ADMIN — Medication 500 MILLIGRAM(S): at 16:03

## 2024-05-26 NOTE — ED PROVIDER NOTE - CLINICAL SUMMARY MEDICAL DECISION MAKING FREE TEXT BOX
79-year-old male past history of hypertension dyslipidemia came to the emergency room with chief complaint of dysuria urgency and frequency started 2 days ago denies any fever or back pain denies any urinary retention  UA consistent with UTI patient discharged with Vantin 200 mg twice a day for 7 days patient given Ceftin in the emergency room

## 2024-05-26 NOTE — ED PROVIDER NOTE - NS ED MD DISPO DISCHARGE CCDA
Patient/Caregiver provided printed discharge information. Methotrexate Pregnancy And Lactation Text: This medication is Pregnancy Category X and is known to cause fetal harm. This medication is excreted in breast milk.

## 2024-05-26 NOTE — ED PROVIDER NOTE - OBJECTIVE STATEMENT
79-year-old male past history of hypertension dyslipidemia came to the emergency room with chief complaint of dysuria urgency and frequency started 2 days ago denies any fever or back pain denies any urinary retention

## 2024-05-26 NOTE — CHART NOTE - NSCHARTNOTEFT_GEN_A_CORE
SW met with the pt at bedside to assess for social work needs and discuss home assessment of safety.  Pt is a 80 y/o male presented to ED for urinary symptoms.    Emergency Department Social Work Geriatric Initial Assessment    Cognitive Mental Status -Alert and oriented, ambulatory   Primary Caregiver Information –[place] [phone]  Emergency Contact Information –[place] [phone]  Primary Care Physician / Specialists -Stacy Garcia  Functional Status Prior to ED Visit - Ambulatory and independent  Family and Social Support –Pt does have strong support of the family and friends.  Living Arrangement -Pt lives  alone in a [facility] house  Services Present on Admission – None identified.   Assistive Devices (Durable Medical Equipment) –None  ADLs & Degree of [place] – Fully independent   Barriers to obtain medications -None  Barriers to attend medical appointments -None  ISAR Score – 0, no major safety issues identified  Advanced Directives –   Follow up care – Attending has recommended primary care follow up.  Pt wants to schedule own apt with primary, declined SW assistance.   Discharge Transportation – self  Summary/Recommendations/Referrals -Pt continues to feel safe at home and has no safety issues. Worker did not identify any major concerns and encouraged to call for assistance. if needed.

## 2024-05-26 NOTE — ED ADULT NURSE NOTE - CHIEF COMPLAINT QUOTE
Patient complaint of frequency urination and burning on urination that started since yesterday. Denies any fever or chills.

## 2024-05-26 NOTE — ED PROVIDER NOTE - PATIENT PORTAL LINK FT
You can access the FollowMyHealth Patient Portal offered by [facility] by registering at the following website: [email or web address]. By joining [employer]’s FollowMyHealth portal, you will also be able to view your health information using other applications (apps) compatible with our system.

## 2024-05-26 NOTE — ED PROVIDER NOTE - NSFOLLOWUPINSTRUCTIONS_ED_ALL_ED_FT
Follow up with your PMD within 1-2 days.  Rest, increase your fluids, advance your activity as tolerated.   Take all of your other medications as previously prescribed.   Worsening, continued or ANY new concerning symptoms return to the emergency department.  Cefpodoxime mean 200 mg twice a day for 7 days drink plenty of fluids and Pyridium 1 tablet twice a day Pyridium can make the urine color orange

## 2024-05-26 NOTE — ED ADULT NURSE NOTE - OBJECTIVE STATEMENT
pt reports difficulty urinating last PM flow improved today with some discomfort.  Skin warm dry color pink. denies fever.

## 2024-05-29 DIAGNOSIS — R35.0 FREQUENCY OF MICTURITION: ICD-10-CM

## 2024-05-29 DIAGNOSIS — N40.0 BENIGN PROSTATIC HYPERPLASIA WITHOUT LOWER URINARY TRACT SYMPMS: ICD-10-CM

## 2024-05-29 LAB
-  AMOXICILLIN/CLAVULANIC ACID: SIGNIFICANT CHANGE UP
-  AMOXICILLIN/CLAVULANIC ACID: SIGNIFICANT CHANGE UP
-  AMPICILLIN/SULBACTAM: SIGNIFICANT CHANGE UP
-  AMPICILLIN/SULBACTAM: SIGNIFICANT CHANGE UP
-  AMPICILLIN: SIGNIFICANT CHANGE UP
-  AMPICILLIN: SIGNIFICANT CHANGE UP
-  AZTREONAM: SIGNIFICANT CHANGE UP
-  AZTREONAM: SIGNIFICANT CHANGE UP
-  CEFAZOLIN: SIGNIFICANT CHANGE UP
-  CEFAZOLIN: SIGNIFICANT CHANGE UP
-  CEFEPIME: SIGNIFICANT CHANGE UP
-  CEFEPIME: SIGNIFICANT CHANGE UP
-  CEFOXITIN: SIGNIFICANT CHANGE UP
-  CEFOXITIN: SIGNIFICANT CHANGE UP
-  CEFTRIAXONE: SIGNIFICANT CHANGE UP
-  CEFTRIAXONE: SIGNIFICANT CHANGE UP
-  CEFUROXIME: SIGNIFICANT CHANGE UP
-  CIPROFLOXACIN: SIGNIFICANT CHANGE UP
-  CIPROFLOXACIN: SIGNIFICANT CHANGE UP
-  ERTAPENEM: SIGNIFICANT CHANGE UP
-  ERTAPENEM: SIGNIFICANT CHANGE UP
-  GENTAMICIN: SIGNIFICANT CHANGE UP
-  GENTAMICIN: SIGNIFICANT CHANGE UP
-  IMIPENEM: SIGNIFICANT CHANGE UP
-  IMIPENEM: SIGNIFICANT CHANGE UP
-  LEVOFLOXACIN: SIGNIFICANT CHANGE UP
-  LEVOFLOXACIN: SIGNIFICANT CHANGE UP
-  MEROPENEM: SIGNIFICANT CHANGE UP
-  MEROPENEM: SIGNIFICANT CHANGE UP
-  NITROFURANTOIN: SIGNIFICANT CHANGE UP
-  NITROFURANTOIN: SIGNIFICANT CHANGE UP
-  PIPERACILLIN/TAZOBACTAM: SIGNIFICANT CHANGE UP
-  PIPERACILLIN/TAZOBACTAM: SIGNIFICANT CHANGE UP
-  TOBRAMYCIN: SIGNIFICANT CHANGE UP
-  TOBRAMYCIN: SIGNIFICANT CHANGE UP
-  TRIMETHOPRIM/SULFAMETHOXAZOLE: SIGNIFICANT CHANGE UP
-  TRIMETHOPRIM/SULFAMETHOXAZOLE: SIGNIFICANT CHANGE UP
CULTURE RESULTS: ABNORMAL
METHOD TYPE: SIGNIFICANT CHANGE UP
METHOD TYPE: SIGNIFICANT CHANGE UP
ORGANISM # SPEC MICROSCOPIC CNT: ABNORMAL
ORGANISM # SPEC MICROSCOPIC CNT: ABNORMAL
ORGANISM # SPEC MICROSCOPIC CNT: SIGNIFICANT CHANGE UP
SPECIMEN SOURCE: SIGNIFICANT CHANGE UP

## 2024-06-09 ENCOUNTER — EMERGENCY (EMERGENCY)
Facility: HOSPITAL | Age: 79
LOS: 1 days | Discharge: HOME | End: 2024-06-09
Attending: EMERGENCY MEDICINE | Admitting: EMERGENCY MEDICINE
Payer: MEDICARE

## 2024-06-09 VITALS
DIASTOLIC BLOOD PRESSURE: 71 MMHG | HEART RATE: 97 BPM | HEIGHT: 72 IN | SYSTOLIC BLOOD PRESSURE: 148 MMHG | WEIGHT: 164.91 LBS | TEMPERATURE: 99 F | OXYGEN SATURATION: 94 % | RESPIRATION RATE: 18 BRPM

## 2024-06-09 VITALS
HEART RATE: 70 BPM | RESPIRATION RATE: 15 BRPM | OXYGEN SATURATION: 96 % | SYSTOLIC BLOOD PRESSURE: 128 MMHG | DIASTOLIC BLOOD PRESSURE: 69 MMHG

## 2024-06-09 LAB
ALBUMIN SERPL ELPH-MCNC: 3.2 G/DL — LOW (ref 3.3–5)
ALP SERPL-CCNC: 56 U/L — SIGNIFICANT CHANGE UP (ref 40–120)
ALT FLD-CCNC: 25 U/L — SIGNIFICANT CHANGE UP (ref 10–45)
ANION GAP SERPL CALC-SCNC: 7 MMOL/L — SIGNIFICANT CHANGE UP (ref 5–17)
APPEARANCE UR: ABNORMAL
AST SERPL-CCNC: 20 U/L — SIGNIFICANT CHANGE UP (ref 10–40)
BACTERIA # UR AUTO: ABNORMAL /HPF
BASOPHILS # BLD AUTO: 0.02 K/UL — SIGNIFICANT CHANGE UP (ref 0–0.2)
BASOPHILS NFR BLD AUTO: 0.2 % — SIGNIFICANT CHANGE UP (ref 0–2)
BILIRUB SERPL-MCNC: 0.7 MG/DL — SIGNIFICANT CHANGE UP (ref 0.2–1.2)
BILIRUB UR-MCNC: ABNORMAL
BUN SERPL-MCNC: 13 MG/DL — SIGNIFICANT CHANGE UP (ref 7–23)
CALCIUM SERPL-MCNC: 8.9 MG/DL — SIGNIFICANT CHANGE UP (ref 8.4–10.5)
CHLORIDE SERPL-SCNC: 105 MMOL/L — SIGNIFICANT CHANGE UP (ref 96–108)
CO2 SERPL-SCNC: 31 MMOL/L — SIGNIFICANT CHANGE UP (ref 22–31)
COLOR SPEC: ABNORMAL
CREAT SERPL-MCNC: 1.37 MG/DL — HIGH (ref 0.5–1.3)
DIFF PNL FLD: ABNORMAL
EGFR: 53 ML/MIN/1.73M2 — LOW
EOSINOPHIL # BLD AUTO: 0.06 K/UL — SIGNIFICANT CHANGE UP (ref 0–0.5)
EOSINOPHIL NFR BLD AUTO: 0.5 % — SIGNIFICANT CHANGE UP (ref 0–6)
GLUCOSE SERPL-MCNC: 112 MG/DL — HIGH (ref 70–99)
GLUCOSE UR QL: NEGATIVE MG/DL — SIGNIFICANT CHANGE UP
HCT VFR BLD CALC: 36.7 % — LOW (ref 39–50)
HGB BLD-MCNC: 12.5 G/DL — LOW (ref 13–17)
IMM GRANULOCYTES NFR BLD AUTO: 0.2 % — SIGNIFICANT CHANGE UP (ref 0–0.9)
KETONES UR-MCNC: NEGATIVE MG/DL — SIGNIFICANT CHANGE UP
LEUKOCYTE ESTERASE UR-ACNC: ABNORMAL
LIDOCAIN IGE QN: 23 U/L — SIGNIFICANT CHANGE UP (ref 16–77)
LYMPHOCYTES # BLD AUTO: 1.16 K/UL — SIGNIFICANT CHANGE UP (ref 1–3.3)
LYMPHOCYTES # BLD AUTO: 8.9 % — LOW (ref 13–44)
MCHC RBC-ENTMCNC: 31.3 PG — SIGNIFICANT CHANGE UP (ref 27–34)
MCHC RBC-ENTMCNC: 34.1 GM/DL — SIGNIFICANT CHANGE UP (ref 32–36)
MCV RBC AUTO: 91.8 FL — SIGNIFICANT CHANGE UP (ref 80–100)
MONOCYTES # BLD AUTO: 0.74 K/UL — SIGNIFICANT CHANGE UP (ref 0–0.9)
MONOCYTES NFR BLD AUTO: 5.7 % — SIGNIFICANT CHANGE UP (ref 2–14)
NEUTROPHILS # BLD AUTO: 11.03 K/UL — HIGH (ref 1.8–7.4)
NEUTROPHILS NFR BLD AUTO: 84.5 % — HIGH (ref 43–77)
NITRITE UR-MCNC: POSITIVE
NRBC # BLD: 0 /100 WBCS — SIGNIFICANT CHANGE UP (ref 0–0)
PH UR: 5.5 — SIGNIFICANT CHANGE UP (ref 5–8)
PLATELET # BLD AUTO: 267 K/UL — SIGNIFICANT CHANGE UP (ref 150–400)
POTASSIUM SERPL-MCNC: 3.8 MMOL/L — SIGNIFICANT CHANGE UP (ref 3.5–5.3)
POTASSIUM SERPL-SCNC: 3.8 MMOL/L — SIGNIFICANT CHANGE UP (ref 3.5–5.3)
PROT SERPL-MCNC: 6.6 G/DL — SIGNIFICANT CHANGE UP (ref 6–8.3)
PROT UR-MCNC: 30 MG/DL
RBC # BLD: 4 M/UL — LOW (ref 4.2–5.8)
RBC # FLD: 13.5 % — SIGNIFICANT CHANGE UP (ref 10.3–14.5)
RBC CASTS # UR COMP ASSIST: ABNORMAL /HPF
SODIUM SERPL-SCNC: 143 MMOL/L — SIGNIFICANT CHANGE UP (ref 135–145)
SP GR SPEC: 1.02 — SIGNIFICANT CHANGE UP (ref 1–1.03)
UROBILINOGEN FLD QL: 0.2 MG/DL — SIGNIFICANT CHANGE UP (ref 0.2–1)
WBC # BLD: 13.04 K/UL — HIGH (ref 3.8–10.5)
WBC # FLD AUTO: 13.04 K/UL — HIGH (ref 3.8–10.5)
WBC UR QL: >50 /HPF — HIGH (ref 0–5)

## 2024-06-09 PROCEDURE — 99284 EMERGENCY DEPT VISIT MOD MDM: CPT

## 2024-06-09 PROCEDURE — 74176 CT ABD & PELVIS W/O CONTRAST: CPT | Mod: 26,MC

## 2024-06-09 PROCEDURE — 87086 URINE CULTURE/COLONY COUNT: CPT

## 2024-06-09 PROCEDURE — 85025 COMPLETE CBC W/AUTO DIFF WBC: CPT

## 2024-06-09 PROCEDURE — 83690 ASSAY OF LIPASE: CPT

## 2024-06-09 PROCEDURE — 80053 COMPREHEN METABOLIC PANEL: CPT

## 2024-06-09 PROCEDURE — 99284 EMERGENCY DEPT VISIT MOD MDM: CPT | Mod: 25

## 2024-06-09 PROCEDURE — 87186 SC STD MICRODIL/AGAR DIL: CPT

## 2024-06-09 PROCEDURE — 36415 COLL VENOUS BLD VENIPUNCTURE: CPT

## 2024-06-09 PROCEDURE — 81001 URINALYSIS AUTO W/SCOPE: CPT

## 2024-06-09 PROCEDURE — 96374 THER/PROPH/DIAG INJ IV PUSH: CPT

## 2024-06-09 PROCEDURE — 74176 CT ABD & PELVIS W/O CONTRAST: CPT | Mod: MC

## 2024-06-09 RX ORDER — CEFTRIAXONE 500 MG/1
1000 INJECTION, POWDER, FOR SOLUTION INTRAMUSCULAR; INTRAVENOUS ONCE
Refills: 0 | Status: COMPLETED | OUTPATIENT
Start: 2024-06-09 | End: 2024-06-09

## 2024-06-09 RX ORDER — MULTIVIT-MIN/FERROUS GLUCONATE 9 MG/15 ML
0 LIQUID (ML) ORAL
Qty: 0 | Refills: 0 | DISCHARGE

## 2024-06-09 RX ORDER — UBIDECARENONE 100 MG
0 CAPSULE ORAL
Qty: 0 | Refills: 0 | DISCHARGE

## 2024-06-09 RX ORDER — CEFUROXIME AXETIL 250 MG
1 TABLET ORAL
Qty: 14 | Refills: 0
Start: 2024-06-09 | End: 2024-06-15

## 2024-06-09 RX ORDER — SODIUM CHLORIDE 9 MG/ML
1000 INJECTION INTRAMUSCULAR; INTRAVENOUS; SUBCUTANEOUS ONCE
Refills: 0 | Status: COMPLETED | OUTPATIENT
Start: 2024-06-09 | End: 2024-06-09

## 2024-06-09 RX ORDER — OMEGA-3 ACID ETHYL ESTERS 1 G
0 CAPSULE ORAL
Qty: 0 | Refills: 0 | DISCHARGE

## 2024-06-09 RX ORDER — ASPIRIN/CALCIUM CARB/MAGNESIUM 324 MG
1 TABLET ORAL
Qty: 0 | Refills: 0 | DISCHARGE

## 2024-06-09 RX ORDER — LABETALOL HCL 100 MG
0.5 TABLET ORAL
Qty: 0 | Refills: 0 | DISCHARGE

## 2024-06-09 RX ORDER — PANTOPRAZOLE SODIUM 20 MG/1
1 TABLET, DELAYED RELEASE ORAL
Qty: 0 | Refills: 0 | DISCHARGE

## 2024-06-09 RX ORDER — ALUMINUM ZIRCONIUM TRICHLOROHYDREX GLY 0.2 G/G
1 STICK TOPICAL
Qty: 0 | Refills: 0 | DISCHARGE

## 2024-06-09 RX ORDER — ATORVASTATIN CALCIUM 80 MG/1
1 TABLET, FILM COATED ORAL
Qty: 0 | Refills: 0 | DISCHARGE

## 2024-06-09 RX ADMIN — CEFTRIAXONE 100 MILLIGRAM(S): 500 INJECTION, POWDER, FOR SOLUTION INTRAMUSCULAR; INTRAVENOUS at 18:57

## 2024-06-09 RX ADMIN — SODIUM CHLORIDE 1000 MILLILITER(S): 9 INJECTION INTRAMUSCULAR; INTRAVENOUS; SUBCUTANEOUS at 18:38

## 2024-06-09 NOTE — ED PROVIDER NOTE - CLINICAL SUMMARY MEDICAL DECISION MAKING FREE TEXT BOX
79-year-old male presents to the emergency department complaining of burning with urination and hematuria.  Patient states that started today.  No flank pain.  Patient states that he had a recent UTI around memorial day weekend.  He did not have hematuria at that time.  No blood thinners other than baby aspirin.  Past medical history hypertension hyperlipidemia and thoracic aortic aneurysm that is well followed and nonoperative at this time.    Exam as stated.  No acute concerns based on exam however patient with painful urination with hematuria.  Rule out kidney stone versus UTI.  Patient has a follow-up appointment with his urologist tomorrow to have a specific scan for his bladder given his recent urinary tract infection around Memorial Day weekend.  [place] would be to assess and release so that he may follow-up with his urologist unless there is a pressing reason for patient to be admitted. Patient signed out to incoming physician.  All decisions regarding the progression of care will be made at their discretion. 79-year-old male presents to the emergency department complaining of burning with urination and hematuria.  Patient states that started today.  No flank pain.  Patient states that he had a recent UTI around memorial day weekend.  He did not have hematuria at that time.  No blood thinners other than baby aspirin.  Past medical history hypertension hyperlipidemia and thoracic aortic aneurysm that is well followed and nonoperative at this time.    Exam as stated.  No acute concerns based on exam however patient with painful urination with hematuria.  Rule out kidney stone versus UTI.  Patient has a follow-up appointment with his urologist tomorrow to have a specific scan for his bladder given his recent urinary tract infection around Memorial Day weekend.  [place] would be to assess and release so that he may follow-up with his urologist unless there is a pressing reason for patient to be admitted. Patient signed out to incoming physician.  All decisions regarding the progression of care will be made at their discretion.    Results with positive UTI.  No kidney stone.  Creatinine noted.  However patient mildly elevated.  Will give patient IV fluids and recommend antibiotic..

## 2024-06-09 NOTE — ED PROVIDER NOTE - TEMPLATE, MLM
----- Message from Kulwinder Alamo MD sent at 12/21/2023  4:59 PM CST -----  Regarding: RE: PT referral  Thank  you for reaching out. Yes can we place another referral with these diagnoses. Thank you for taking care of her.   ----- Message -----  From: Kaylee Stahl PT  Sent: 12/21/2023   2:07 PM CST  To: Kulwinder Alamo MD; #  Subject: PT referral                                      Hi Dr Raz Trimble,  I have been working with Raul Walker since October under the referral for myofascial pain syndrome of the thoracic spine which has essentially resolved. We will finish her authorized sessions next week. While I have incorporated gait training and balance into her sessions and I think it would be beneficial for her to continue. I do think it would be best for another referral for PT to be placed under gait disturbance, LE weakness or balance disturbance. I can then evaluate her and submit for more authorization under these diagnoses. Thank you   Kaylee Beckham PT  ----- Message -----  From: Kulwinder Alamo MD  Sent: 11/18/2023   1:29 AM CST  To: Kaylee Stahl, PT    Tere Jaya,     Thank you for taking care of Raul Walker. She is noting benefit from your sessions. Once she receives her AFO, would you be able to assist her in gait training and improving her dorsiflexion weakness or does she need a referral for a Neurorehab regimen? Thanks and I hope to hear from you soon.     Sincerely,    Kulwinder Alamo [facility]

## 2024-06-09 NOTE — ED PROVIDER NOTE - NSFOLLOWUPINSTRUCTIONS_ED_ALL_ED_FT
Urinary Tract Infection    Please maintain your follow-up appointment with the urologist.  Take antibiotic as prescribed. See attached for copy of results.    A urinary tract infection (UTI) is an infection of any part of the urinary tract, which includes the kidneys, ureters, bladder, and urethra. Risk factors include ignoring your need to urinate, wiping back to front if female, being an uncircumcised male, and having diabetes or a weak immune system. Symptoms include frequent urination, pain or burning with urination, foul smelling urine, cloudy urine, pain in the lower abdomen, blood in the urine, and fever. If you were prescribed an antibiotic medicine, take it as told by your health care provider. Do not stop taking the antibiotic even if you start to feel better.    SEEK IMMEDIATE MEDICAL CARE IF YOU HAVE ANY OF THE FOLLOWING SYMPTOMS: severe back or abdominal pain, fever, inability to keep fluids or medicine down, dizziness/lightheadedness, or a change in mental status.

## 2024-06-09 NOTE — ED ADULT NURSE NOTE - NSFALLUNIVINTERV_ED_ALL_ED
Bed/Stretcher in lowest position, wheels locked, appropriate side rails in place/Call bell, personal items and telephone in reach/Instruct patient to call for assistance before getting out of bed/chair/stretcher/Non-slip footwear applied when patient is off stretcher/[place] to call system/Physically safe environment - no spills, clutter or unnecessary equipment/Purposeful proactive rounding/Room/bathroom lighting operational, light cord in reach

## 2024-06-09 NOTE — ED PROVIDER NOTE - PHYSICAL EXAMINATION
General:     NAD, well-nourished, well-appearing  Abd:     +BS, s/nt/nd, no cva tenderness.   Ext:   no deformities   Skin: no rash  Neuro: AAOx3, no sensory/motor deficits

## 2024-06-09 NOTE — ED ADULT NURSE NOTE - AS PAIN REST
Post-Op Assessment Note    CV Status:  Stable  Pain Score: 0    Pain management: adequate     Mental Status:  Alert and awake   Hydration Status:  Euvolemic   PONV Controlled:  Controlled   Airway Patency:  Patent      Post Op Vitals Reviewed: Yes      Staff: CRNA, Anesthesiologist         No complications documented      /51 (07/12/22 0847)    Temp (!) 97 2 °F (36 2 °C) (07/12/22 0847)    Pulse 83 (07/12/22 0847)   Resp 18 (07/12/22 0847)    SpO2 99 % (07/12/22 0847)
0 (no pain/absence of nonverbal indicators of pain)

## 2024-06-09 NOTE — ED PROVIDER NOTE - PATIENT PORTAL LINK FT
You can access the FollowMyHealth Patient Portal offered by [facility] by registering at the following website: [email or web address]. By joining [employer]’s FollowMyHealth portal, you will also be able to view your health information using other applications (apps) compatible with our system.

## 2024-06-09 NOTE — ED PROVIDER NOTE - OBJECTIVE STATEMENT
79-year-old male presents to the emergency department complaining of burning with urination and hematuria.  Patient states that started today.  No flank pain.  Patient states that he had a recent UTI around memorial day weekend.  He did not have hematuria at that time.  No blood thinners other than baby aspirin.  Past medical history hypertension hyperlipidemia and thoracic aortic aneurysm that is well followed and nonoperative at this time.

## 2024-06-13 ENCOUNTER — RX RENEWAL (OUTPATIENT)
Age: 79
End: 2024-06-13

## 2024-06-13 ENCOUNTER — APPOINTMENT (OUTPATIENT)
Dept: ORTHOPEDIC SURGERY | Facility: CLINIC | Age: 79
End: 2024-06-13

## 2024-06-18 NOTE — CHART NOTE - NSCHARTNOTEFT_GEN_A_CORE
79 y o male presenting to the ED on 6/9/24 for hematuria.  SW made a courtesy call to assist with scheduling the recommended urology appointment.  The patient reported feeling better and had followed up with his urologist within one week of his ED visit.  Patient will be having a procedure on 7/17/24 with his urologist.

## 2024-06-29 ENCOUNTER — NON-APPOINTMENT (OUTPATIENT)
Age: 79
End: 2024-06-29

## 2024-07-16 NOTE — ED ADULT TRIAGE NOTE - ADDITIONAL SAFETY/BANDS...
Addended by: TAMIR LOMBARDO on: 7/16/2024 08:58 AM     Modules accepted: Orders     Additional Safety/Bands:

## 2024-08-14 ENCOUNTER — APPOINTMENT (OUTPATIENT)
Dept: CARDIOLOGY | Facility: CLINIC | Age: 79
End: 2024-08-14
Payer: MEDICARE

## 2024-08-14 VITALS
OXYGEN SATURATION: 98 % | SYSTOLIC BLOOD PRESSURE: 151 MMHG | HEIGHT: 72 IN | HEART RATE: 56 BPM | WEIGHT: 163 LBS | BODY MASS INDEX: 22.08 KG/M2 | DIASTOLIC BLOOD PRESSURE: 74 MMHG

## 2024-08-14 DIAGNOSIS — I71.20 THORACIC AORTIC ANEURYSM, WITHOUT RUPTURE, UNSPECIFIED: ICD-10-CM

## 2024-08-14 PROCEDURE — 93000 ELECTROCARDIOGRAM COMPLETE: CPT

## 2024-08-14 PROCEDURE — 99213 OFFICE O/P EST LOW 20 MIN: CPT | Mod: 25

## 2024-08-14 NOTE — ASSESSMENT
[FreeTextEntry1] : 8/12/22 no new recommendations  2/14/23 maintain current dosing 110/50 6 months Rx to [facility] aortic dimension stable 4.39  8/15/23 follow-up for a borderline aortic aneurysm antihypertensives good control  2/14/2024 No new cardiac recommendations  8/14/2024 No new cardiac recommendations yearly echo evaluates aortic aneurysm  medical necessity low risk encounter based upon two more chronic stable conditions aortic ectasia and hypertension  EKG indicative hypertension

## 2024-08-14 NOTE — CARDIOLOGY SUMMARY
[Normal] : normal [___] : [unfilled] [No Ischemia] : no Ischemia [de-identified] : 2/11/22 stable aortic dimension [de-identified] : 2/11/22 sinus rhythm PACs low-voltage limb lead

## 2024-08-14 NOTE — REASON FOR VISIT
[FreeTextEntry1] : no new complaint for stanislav doing well   8/12/22 no new complaints  2/14/23 doing well without cardiac complaints  8/15/23 doing well without cardiac complaints would continue current course  2/14/2024 No new cardiac recommendations no current signs or symptoms echo satisfactory  8/14/2024 Stanislav doing well without signs or symptoms

## 2024-08-14 NOTE — PHYSICAL EXAM
[Well Developed] : well developed [Well Nourished] : well nourished [No Acute Distress] : no acute distress [Normal Venous Pressure] : normal venous pressure [No Carotid Bruit] : no carotid bruit [Normal S1, S2] : normal S1, S2 [No Murmur] : no murmur [No Rub] : no rub [No Gallop] : no gallop [Normal] : clear lung fields, good air entry, no respiratory distress [Clear Lung Fields] : clear lung fields [Good Air Entry] : good air entry [No Respiratory Distress] : no respiratory distress  [Soft] : abdomen soft [Non Tender] : non-tender [No Masses/organomegaly] : no masses/organomegaly [Normal Bowel Sounds] : normal bowel sounds [Normal Gait] : normal gait [No Edema] : no edema [No Cyanosis] : no cyanosis [No Clubbing] : no clubbing [No Varicosities] : no varicosities [No Rash] : no rash [No Skin Lesions] : no skin lesions [Moves all extremities] : moves all extremities [No Focal Deficits] : no focal deficits [Normal Speech] : normal speech [Alert and Oriented] : alert and oriented [Normal memory] : normal memory [Normal Conjunctiva] : the conjunctiva exhibited no abnormalities [Eyelids - No Xanthelasma] : the eyelids demonstrated no xanthelasmas [Normal Oral Mucosa] : normal oral mucosa [No Oral Pallor] : no oral pallor [No Oral Cyanosis] : no oral cyanosis [Normal Jugular Venous A Waves Present] : normal jugular venous A waves present [Normal Jugular Venous V Waves Present] : normal jugular venous V waves present [No Jugular Venous Ruiz A Waves] : no jugular venous ruiz A waves [Respiration, Rhythm And Depth] : normal respiratory rhythm and effort [Exaggerated Use Of Accessory Muscles For Inspiration] : no accessory muscle use [Auscultation Breath Sounds / Voice Sounds] : lungs were clear to auscultation bilaterally [Heart Rate And Rhythm] : heart rate and rhythm were normal [Heart Sounds] : normal S1 and S2 [Murmurs] : no murmurs present [Abdomen Soft] : soft [Abdomen Tenderness] : non-tender [Abdomen Mass (___ Cm)] : no abdominal mass palpated [Abnormal Walk] : normal gait [Gait - Sufficient For Exercise Testing] : the gait was sufficient for exercise testing [Nail Clubbing] : no clubbing of the fingernails [Cyanosis, Localized] : no localized cyanosis [Petechial Hemorrhages (___cm)] : no petechial hemorrhages [Skin Color & Pigmentation] : normal skin color and pigmentation [] : no rash [No Venous Stasis] : no venous stasis [Skin Lesions] : no skin lesions [No Skin Ulcers] : no skin ulcer [No Xanthoma] : no  xanthoma was observed [Oriented To Time, Place, And Person] : oriented to person, place, and time [Affect] : the affect was normal [Mood] : the mood was normal [No Anxiety] : not feeling anxious [de-identified] : cough [FreeTextEntry1] : appears older than stated age

## 2024-09-24 ENCOUNTER — RX RENEWAL (OUTPATIENT)
Age: 79
End: 2024-09-24

## 2024-12-12 ENCOUNTER — RX RENEWAL (OUTPATIENT)
Age: 79
End: 2024-12-12
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
## 2024-12-22 ENCOUNTER — EMERGENCY (EMERGENCY)
Facility: HOSPITAL | Age: 79
LOS: 1 days | Discharge: HOME | End: 2024-12-22
Attending: INTERNAL MEDICINE | Admitting: INTERNAL MEDICINE
Payer: SELF-PAY

## 2024-12-22 VITALS
OXYGEN SATURATION: 99 % | HEIGHT: 72 IN | DIASTOLIC BLOOD PRESSURE: 77 MMHG | TEMPERATURE: 98 F | HEART RATE: 71 BPM | WEIGHT: 164.91 LBS | SYSTOLIC BLOOD PRESSURE: 166 MMHG | RESPIRATION RATE: 18 BRPM

## 2024-12-22 PROCEDURE — 99284 EMERGENCY DEPT VISIT MOD MDM: CPT

## 2024-12-22 PROCEDURE — 72170 X-RAY EXAM OF PELVIS: CPT | Mod: 26

## 2024-12-22 PROCEDURE — 99283 EMERGENCY DEPT VISIT LOW MDM: CPT

## 2024-12-22 PROCEDURE — 72170 X-RAY EXAM OF PELVIS: CPT

## 2024-12-22 RX ORDER — ACETAMINOPHEN 500MG 500 MG/1
1 TABLET, COATED ORAL
Qty: 30 | Refills: 0
Start: 2024-12-22 | End: 2024-12-31

## 2024-12-22 RX ORDER — LIDOCAINE 40 MG/G
1 CREAM TOPICAL
Qty: 2 | Refills: 0
Start: 2024-12-22 | End: 2024-12-31

## 2024-12-22 RX ORDER — ACETAMINOPHEN 500MG 500 MG/1
975 TABLET, COATED ORAL ONCE
Refills: 0 | Status: COMPLETED | OUTPATIENT
Start: 2024-12-22 | End: 2024-12-22

## 2024-12-22 RX ORDER — LIDOCAINE 40 MG/G
1 CREAM TOPICAL ONCE
Refills: 0 | Status: COMPLETED | OUTPATIENT
Start: 2024-12-22 | End: 2024-12-22

## 2024-12-22 RX ADMIN — LIDOCAINE 1 PATCH: 40 CREAM TOPICAL at 12:13

## 2024-12-22 RX ADMIN — ACETAMINOPHEN 500MG 975 MILLIGRAM(S): 500 TABLET, COATED ORAL at 12:12

## 2024-12-22 NOTE — ED PROVIDER NOTE - OBJECTIVE STATEMENT
79 year old male with PMHx HTN, HLD, previous UTIs p/w lt groin pain x1 week. 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 Pt denies a palpable bulge. Pt states that it "burns" and feels "like something is dripping." Pt denies pain in chest/abdomen, urinary sx.

## 2024-12-22 NOTE — ED ADULT NURSE NOTE - NSFALLUNIVINTERV_ED_ALL_ED
Bed/Stretcher in lowest position, wheels locked, appropriate side rails in place/Call bell, personal items and telephone in reach/Instruct patient to call for assistance before getting out of bed/chair/stretcher/Non-slip footwear applied when patient is off stretcher/[place] to call system/Physically safe environment - no spills, clutter or unnecessary equipment/Purposeful proactive rounding/Room/bathroom lighting operational, light cord in reach

## 2024-12-22 NOTE — ED PROVIDER NOTE - NSFOLLOWUPINSTRUCTIONS_ED_ALL_ED_FT
Follow up with your PMD within 1-2 days.  Rest, increase your fluids, advance your activity as tolerated.   Take all of your other medications as previously prescribed.   Worsening, continued or ANY new concerning symptoms return to the emergency department.  Tylenol for pain and lidocaine patch once a day 12 hours on 12 hours off

## 2024-12-22 NOTE — ED PROVIDER NOTE - PATIENT PORTAL LINK FT
You can access the FollowMyHealth Patient Portal offered by [facility] by registering at the following website: [email or web address]. By joining [employer]’s FollowMyHealth portal, you will also be able to view your health information using other applications (apps) compatible with our system.

## 2024-12-22 NOTE — ED PROVIDER NOTE - CLINICAL SUMMARY MEDICAL DECISION MAKING FREE TEXT BOX
79 year old male with PMHx HTN, HLD, previous UTIs p/w lt groin pain x1 week. 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 Pt denies a palpable bulge. Pt states that it "burns" and feels "like something is dripping." Pt denies pain in chest/abdomen, urinary sx.  X-ray pelvis negative Tylenol and lidocaine patch given

## 2024-12-22 NOTE — ED PROVIDER NOTE - ATTENDING CONTRIBUTION TO CARE
79 year old male with PMHx HTN, HLD, previous UTIs p/w lt groin pain x1 week. 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 Pt denies a palpable bulge. Pt states that it "burns" and feels "like something is dripping." Pt denies pain in chest/abdomen, urinary sx.  X-ray pelvis negative Tylenol and lidocaine patch given  Dr. Munroe:  I have reviewed and discussed with the PAstudent  the case specifics, including the history, physical assessment, evaluation, conclusion, laboratory results, and medical plan. I agree with the contents, and conclusions. I have personally examined, and interviewed the patient.

## 2024-12-22 NOTE — ED ADULT NURSE NOTE - OBJECTIVE STATEMENT
Pt presents Pt presents to ED from home for left sided groin pain. 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 Denies trauma to area. No signs of bruising.

## 2024-12-22 NOTE — ED PROVIDER NOTE - NS ED ATTENDING STATEMENT MOD
Attending Only I have seen and examined this patient and fully participated in the care of this patient as the teaching attending.  The service was shared with the POOJA.  I reviewed and verified the documentation.

## 2024-12-22 NOTE — ED PROVIDER NOTE - PHYSICAL EXAMINATION
General:     NAD, well-nourished, well-appearing  Head:     NC/AT, EOMI, oral mucosa moist  Neck:     trachea midline  Lungs:     CTA b/l, no w/r/r  CVS:     S1S2, RRR, no m/g/r  Abd:     +BS, s/nt/nd, no organomegaly  Ext:    2+ radial and pedal pulses, no c/c/e  Neuro: AAOx3, no sensory/motor deficits  –mild left inguinal tenderness no hernia noted bilateral testicles normal no scrotal fullness

## 2024-12-28 NOTE — CHART NOTE - NSCHARTNOTEFT_GEN_A_CORE
Pt is a 78 y/o male presented to ED for groin pain.  Attending has recommended for follow with the primary.  As per [facility], pt has scheduled primary are appt with Stacy Leavitt on 1/8/25.

## 2025-01-01 ENCOUNTER — EMERGENCY (EMERGENCY)
Facility: HOSPITAL | Age: 80
LOS: 1 days | Discharge: HOME | End: 2025-01-01
Attending: EMERGENCY MEDICINE | Admitting: EMERGENCY MEDICINE
Payer: SELF-PAY

## 2025-01-01 VITALS
HEART RATE: 67 BPM | RESPIRATION RATE: 18 BRPM | OXYGEN SATURATION: 98 % | DIASTOLIC BLOOD PRESSURE: 80 MMHG | HEIGHT: 72 IN | TEMPERATURE: 98 F | WEIGHT: 164.91 LBS | SYSTOLIC BLOOD PRESSURE: 136 MMHG

## 2025-01-01 PROCEDURE — 99053 MED SERV 10PM-8AM 24 HR FAC: CPT

## 2025-01-01 PROCEDURE — 99282 EMERGENCY DEPT VISIT SF MDM: CPT

## 2025-01-01 PROCEDURE — 99283 EMERGENCY DEPT VISIT LOW MDM: CPT

## 2025-01-01 NOTE — ED PROVIDER NOTE - CLINICAL SUMMARY MEDICAL DECISION MAKING FREE TEXT BOX
79-year-old male with history of hypertension, hyperlipidemia complaining of left groin bulge and some burning pain is intermittent that seemed worse overnight today.  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  He was evaluated here December 22 for this.  He had a pelvis x-ray that day which was unremarkable.  She denies any radiation down the leg.  Denies any leg weakness numbness.  Denies any scrotal swelling or pain.  No penile discharge or lesions.  No fever or chills.  No abdominal pain.  No nausea vomiting or diarrhea.  Normal bowel movements.    Patient well-appearing.  Vitals unremarkable.  His left groin exam is unremarkable.  There is no palpable hernia or lymphadenopathy.  There are no lesions or signs of cellulitis.  No scrotal abnormality or mass/hernia.  Full range of motion left hip.  Possible groin muscle strain/spasm, small hernia, nerve irritation.  Recommend follow-up with PMD, general surgery.  Tylenol and Motrin as needed for pain.

## 2025-01-01 NOTE — ED ADULT NURSE NOTE - CAS ELECT INFOMATION PROVIDED
[facility] has been called spoke with   Ms siddhartha and she then transferred me to the pharmacy to clarify the direction and how many to dispense , the order has been clarified and routed to Ms Yaniv  Medication was refilled in may with a sig for the patient to take one tablet BID dispense 180 with three refills.  [facility] has been given a verbal to refill the original RX for this patient.   DC instructions

## 2025-01-01 NOTE — ED ADULT NURSE NOTE - OBJECTIVE STATEMENT
Pt is alert, came to the ER due to left groin pain of 4/10. No nausea or vomiting or dysuria, fever. Pt was here a week ago for the same problem . Pt is alert, came to the ER due to left groin pain of 4/10. No nausea or vomiting or dysuria, fever. Pt was here a week ago for the same problem  and was discharged.

## 2025-01-01 NOTE — ED PROVIDER NOTE - NSFOLLOWUPINSTRUCTIONS_ED_ALL_ED_FT
-Follow-up with your primary care Dr. Stacy Ojeda or surgeon Dr. Luciano Aquino this week.  -You can continue Tylenol for pain.  In addition you can also take over-the-counter Motrin as needed as directed  -Return for worsening pain or swelling, fevers or other concerning

## 2025-01-01 NOTE — ED PROVIDER NOTE - PHYSICAL EXAMINATION
exam:   General: well appearing, NAD.   cor: RRR, s1s2, 2+rad pulses.   lungs: ctabl, no resp distress.   abd: soft, ntnd.   : no cvat. Penis normal, no lesions or discharge.  No scrotal swelling mass tenderness or discoloration.  Left groin/inguinal area in no palpable defect swelling or mass.  No palpable hernia.  No lymphadenopathy.  No discoloration or lesions.  Full range of motion left hip without discomfort.  Left lower extremity normal distal sensation and strength with 2+ DP pulse.  neuro: a&ox3, cn2-12 intact, RODRIGUEZ, 5/5 strength c nl sensation all extremities, nl coordination.   MSK: no extremity swelling.  Skin: normal, no rash

## 2025-01-01 NOTE — ED PROVIDER NOTE - PATIENT PORTAL LINK FT
You can access the FollowMyHealth Patient Portal offered by [facility] by registering at the following website: [email or web address]. By joining [employer]’s FollowMyHealth portal, you will also be able to view your health information using other applications (apps) compatible with our system.

## 2025-01-01 NOTE — ED PROVIDER NOTE - CARE PROVIDER_API CALL
Luciano Aquino  Surgery  [address] [place] [address], [address]  [place] [address]  Phone: [phone]  Fax: [phone]  Follow Up Time: 4-6 Days

## 2025-01-01 NOTE — ED PROVIDER NOTE - OBJECTIVE STATEMENT
79-year-old male with history of hypertension, hyperlipidemia complaining of left groin bulge and some burning pain is intermittent that seemed worse overnight today.  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  He was evaluated here December 22 for this.  He had a pelvis x-ray that day which was unremarkable.  She denies any radiation down the leg.  Denies any leg weakness numbness.  Denies any scrotal swelling or pain.  No penile discharge or lesions.  No fever or chills.  No abdominal pain.  No nausea vomiting or diarrhea.  Normal bowel movements.

## 2025-01-07 ENCOUNTER — APPOINTMENT (OUTPATIENT)
Dept: SURGERY | Facility: CLINIC | Age: 80
End: 2025-01-07
Payer: OTHER MISCELLANEOUS

## 2025-01-07 PROCEDURE — 99203 OFFICE O/P NEW LOW 30 MIN: CPT

## 2025-01-08 ENCOUNTER — APPOINTMENT (OUTPATIENT)
Dept: FAMILY MEDICINE | Facility: CLINIC | Age: 80
End: 2025-01-08
Payer: MEDICARE

## 2025-01-08 VITALS
DIASTOLIC BLOOD PRESSURE: 73 MMHG | HEART RATE: 73 BPM | SYSTOLIC BLOOD PRESSURE: 138 MMHG | OXYGEN SATURATION: 96 % | RESPIRATION RATE: 16 BRPM | TEMPERATURE: 98.3 F

## 2025-01-08 DIAGNOSIS — K40.90 UNILATERAL INGUINAL HERNIA, W/OUT OBSTRUCTION OR GANGRENE, NOT SPECIFIED AS RECURRENT: ICD-10-CM

## 2025-01-08 DIAGNOSIS — E78.5 HYPERLIPIDEMIA, UNSPECIFIED: ICD-10-CM

## 2025-01-08 DIAGNOSIS — N40.0 BENIGN PROSTATIC HYPERPLASIA WITHOUT LOWER URINARY TRACT SYMPMS: ICD-10-CM

## 2025-01-08 DIAGNOSIS — Z23 ENCOUNTER FOR IMMUNIZATION: ICD-10-CM

## 2025-01-08 DIAGNOSIS — I25.10 ATHEROSCLEROTIC HEART DISEASE OF NATIVE CORONARY ARTERY W/OUT ANGINA PECTORIS: ICD-10-CM

## 2025-01-08 PROCEDURE — G2211 COMPLEX E/M VISIT ADD ON: CPT

## 2025-01-08 PROCEDURE — 90677 PCV20 VACCINE IM: CPT

## 2025-01-08 PROCEDURE — G0009: CPT

## 2025-01-08 PROCEDURE — 99213 OFFICE O/P EST LOW 20 MIN: CPT

## 2025-01-08 RX ORDER — TAMSULOSIN HYDROCHLORIDE 0.4 MG/1
0.4 CAPSULE ORAL
Qty: 30 | Refills: 0 | Status: ACTIVE | COMMUNITY
Start: 2025-01-08

## 2025-01-08 NOTE — CHART NOTE - NSCHARTNOTEFT_GEN_A_CORE
79 y o male presenting to the ED on 01/01 with groin pain as per chart.  Per [facility], the patient attended the recommended general surgery follow up appointment on 01/07 with Dr. Luciano Aquino.

## 2025-01-14 RX ORDER — FLUCONAZOLE 100 MG/1
100 TABLET ORAL
Qty: 2 | Refills: 1 | Status: DISCONTINUED | COMMUNITY
Start: 2025-01-14 | End: 2025-01-14

## 2025-01-15 ENCOUNTER — APPOINTMENT (OUTPATIENT)
Dept: FAMILY MEDICINE | Facility: CLINIC | Age: 80
End: 2025-01-15

## 2025-01-17 ENCOUNTER — APPOINTMENT (OUTPATIENT)
Dept: CARDIOLOGY | Facility: CLINIC | Age: 80
End: 2025-01-17

## 2025-02-26 NOTE — HISTORY OF PRESENT ILLNESS
[FreeTextEntry1] : no new complaint for stanislav doing well Stanislav recently noted irregular heartbeats. had a cough this AM however PCR is negative by report.  Unable to Assess